# Patient Record
Sex: MALE | Race: WHITE | NOT HISPANIC OR LATINO | Employment: PART TIME | URBAN - NONMETROPOLITAN AREA
[De-identification: names, ages, dates, MRNs, and addresses within clinical notes are randomized per-mention and may not be internally consistent; named-entity substitution may affect disease eponyms.]

---

## 2019-11-22 ENCOUNTER — ALLIED HEALTH/NURSE VISIT (OUTPATIENT)
Dept: FAMILY MEDICINE | Facility: OTHER | Age: 64
End: 2019-11-22
Attending: PHYSICIAN ASSISTANT
Payer: MEDICAID

## 2019-11-22 ENCOUNTER — HOSPITAL ENCOUNTER (EMERGENCY)
Facility: OTHER | Age: 64
Discharge: HOME | End: 2019-11-22
Attending: PHYSICIAN ASSISTANT | Admitting: PHYSICIAN ASSISTANT
Payer: MEDICAID

## 2019-11-22 VITALS — DIASTOLIC BLOOD PRESSURE: 127 MMHG | SYSTOLIC BLOOD PRESSURE: 193 MMHG

## 2019-11-22 VITALS
RESPIRATION RATE: 12 BRPM | SYSTOLIC BLOOD PRESSURE: 180 MMHG | HEIGHT: 66 IN | TEMPERATURE: 97.6 F | DIASTOLIC BLOOD PRESSURE: 105 MMHG | HEART RATE: 88 BPM | WEIGHT: 188 LBS | BODY MASS INDEX: 30.22 KG/M2 | OXYGEN SATURATION: 98 %

## 2019-11-22 DIAGNOSIS — J02.9 SORE THROAT: ICD-10-CM

## 2019-11-22 DIAGNOSIS — R05.9 COUGH: ICD-10-CM

## 2019-11-22 DIAGNOSIS — I16.0 HYPERTENSIVE URGENCY: ICD-10-CM

## 2019-11-22 DIAGNOSIS — Z01.30 BLOOD PRESSURE CHECK: Primary | ICD-10-CM

## 2019-11-22 PROCEDURE — 99283 EMERGENCY DEPT VISIT LOW MDM: CPT | Mod: Z6 | Performed by: PHYSICIAN ASSISTANT

## 2019-11-22 PROCEDURE — 99282 EMERGENCY DEPT VISIT SF MDM: CPT | Performed by: PHYSICIAN ASSISTANT

## 2019-11-22 RX ORDER — CHLORTHALIDONE 25 MG/1
25 TABLET ORAL DAILY
Qty: 14 TABLET | Refills: 0 | Status: SHIPPED | OUTPATIENT
Start: 2019-11-22 | End: 2019-12-05

## 2019-11-22 RX ORDER — BENZONATATE 100 MG/1
100 CAPSULE ORAL 3 TIMES DAILY PRN
Qty: 21 CAPSULE | Refills: 0 | Status: SHIPPED | OUTPATIENT
Start: 2019-11-22 | End: 2019-12-17

## 2019-11-22 ASSESSMENT — MIFFLIN-ST. JEOR: SCORE: 1590.51

## 2019-11-22 NOTE — ED TRIAGE NOTES
"ED Nursing Triage Note (General)   ________________________________    Gallo Holliday is a 64 year old Male that presents to triage private car  With history of  Was seen at  and sent to us due to being hypertensive, went to the  because he has had a sore throat for 2 days and could tell his B/P was high due to feeling his heartbeat in his chest, has a dry cough, reported by patient   Significant symptoms had onset 3 day(s) ago.  BP (!) 199/117   Pulse 94   Temp 97.6  F (36.4  C) (Tympanic)   Resp 12   Ht 1.676 m (5' 6\")   Wt 85.3 kg (188 lb)   SpO2 97%   BMI 30.34 kg/m  t  Patient appears alert , in mild distress., and cooperative behavior.    GCS15  Airway: intact  Breathing noted as Normal.  Circulation Normal  Skin normal  Action taken:  To room 909      PRE HOSPITAL PRIOR LIVING SITUATION Alone  "

## 2019-11-22 NOTE — NURSING NOTE
"Patient presents to clinic for blood pressure check. Patient was brought to [facility] ED per [facility] providers. No chief complaint on file.      Initial BP (!) 193/127 (BP Location: Left arm, Patient Position: Sitting, Cuff Size: Adult Large)  Estimated body mass index is 27.41 kg/m  as calculated from the following:    Height as of 11/27/15: 1.702 m (5' 7\").    Weight as of 11/27/15: 79.4 kg (175 lb).  Medication Reconciliation: complete    Ally Wyatt LPN      "

## 2019-11-22 NOTE — ED AVS SNAPSHOT
[facility]  [address]  [place] [address]  Phone:  [phone]  Fax:  [phone]                                    Gallo Holliday   MRN: [id number]    Department:  [facility]   Date of Visit:  11/22/2019           After Visit Summary Signature Page    I have received my discharge instructions, and my questions have been answered. I have discussed any challenges I see with this plan with the nurse or doctor.    ..........................................................................................................................................  Patient/Patient Representative Signature      ..........................................................................................................................................  Patient Representative Print Name and Relationship to Patient    ..................................................               ................................................  Date                                   Time    ..........................................................................................................................................  Reviewed by Signature/Title    ...................................................              ..............................................  Date                                               Time          22EPIC Rev 08/18

## 2019-11-22 NOTE — DISCHARGE INSTRUCTIONS
Get plenty of fluids and rest.  Take your medication as directed.  We discussed the difference between hypertensive emergency versus hypertensive urgency.  Currently, it appears you are suffering from a hypertensive urgency has there are no signs of endorgan dysfunction.  If you begin to develop increased headaches, blurry vision, chest pain, shortness of breath or difficulty urinating, especially with elevated blood pressures they need to return to the ED for further evaluation.  Otherwise, referral was placed for you to establish care with a PCP and address your overall health.  Most likely, you are suffering from a upper respiratory viral infection, this can sometimes take 1 to 2 weeks to fully recover from, if you develop increased fevers or other worsening or concerning symptoms he should also return to the ED for further evaluation.

## 2019-11-26 ASSESSMENT — ENCOUNTER SYMPTOMS
SHORTNESS OF BREATH: 0
CHILLS: 0
WOUND: 0
CONFUSION: 0
ABDOMINAL PAIN: 0
SORE THROAT: 1
CHEST TIGHTNESS: 0
HEMATURIA: 0
FEVER: 0
BACK PAIN: 0
ADENOPATHY: 0
COUGH: 1
BRUISES/BLEEDS EASILY: 0

## 2019-11-26 NOTE — ED PROVIDER NOTES
History     Chief Complaint   Patient presents with     Hypertension     HPI  Gallo Holliday is a 64 year old male who presents to the ED with a complaint of HTN. He was seen at the  for a sore throat and sent to the ED due to high BP readings. He reports being able to feel his heart beat in his chest when his BP increases. This has been an ongoing issue for him for quite some time, although not currently on BP medication after losing weight a couple of years ago. His ST has been present for a couple of days along with a dry cough. He denies chest pain, sob, HA, abd pain.     Allergies:  Allergies   Allergen Reactions     No Known Drug Allergies        Problem List:    Patient Active Problem List    Diagnosis Date Noted     Health Care Home 07/15/2011     Priority: Medium     X.  DX V65.8 REPLACED WITH 74502 [facility]ALTH CARE HOME (04/08/2013)       CARDIOVASCULAR SCREENING; LDL GOAL LESS THAN 160 10/31/2010     Priority: Medium        Past Medical History:    History reviewed. No pertinent past medical history.    Past Surgical History:    Past Surgical History:   Procedure Laterality Date     OTHER SURGICAL HISTORY      [id number],NO PREVIOUS SURGERY       Family History:    History reviewed. No pertinent family history.    Social History:  Marital Status:   [2]  Social History     Tobacco Use     Smoking status: Never Smoker     Smokeless tobacco: Former User   Substance Use Topics     Alcohol use: Yes     Drug use: None        Medications:    benzonatate (TESSALON) 100 MG capsule  chlorthalidone (HYGROTON) 25 MG tablet  NO ACTIVE MEDICATIONS          Review of Systems   Constitutional: Negative for chills and fever.   HENT: Positive for sore throat. Negative for congestion.    Eyes: Negative for visual disturbance.   Respiratory: Positive for cough. Negative for chest tightness and shortness of breath.    Cardiovascular: Negative for chest pain.   Gastrointestinal: Negative for abdominal pain.   Genitourinary:  "Negative for hematuria.   Musculoskeletal: Negative for back pain.   Skin: Negative for rash and wound.   Neurological: Negative for syncope.   Hematological: Negative for adenopathy. Does not bruise/bleed easily.   Psychiatric/Behavioral: Negative for confusion.       Physical Exam   BP: (!) 199/117  Pulse: 94  Temp: 97.6  F (36.4  C)  Resp: 12  Height: 167.6 cm (5' 6\")  Weight: 85.3 kg (188 lb)  SpO2: 97 %      Physical Exam  Constitutional:       General: He is not in acute distress.     Appearance: He is well-developed. He is not diaphoretic.   HENT:      Head: Normocephalic and atraumatic.   Eyes:      General: No scleral icterus.     Conjunctiva/sclera: Conjunctivae normal.   Neck:      Musculoskeletal: Neck supple.   Cardiovascular:      Rate and Rhythm: Normal rate and regular rhythm.   Pulmonary:      Effort: Pulmonary effort is normal.      Breath sounds: Normal breath sounds.   Abdominal:      Palpations: Abdomen is soft.      Tenderness: There is no abdominal tenderness.   Musculoskeletal:         General: No deformity.   Lymphadenopathy:      Cervical: No cervical adenopathy.   Skin:     General: Skin is warm and dry.      Findings: No rash.   Neurological:      General: No focal deficit present.      Mental Status: He is alert.   Psychiatric:         Mood and Affect: Mood normal.         Behavior: Behavior normal.         ED Course        Procedures               Critical Care time:  none               No results found for this or any previous visit (from the past 24 hour(s)).    Medications - No data to display    Assessments & Plan (with Medical Decision Making)   Pt nontoxic in NAD. Heart, lung, bowel sounds normal. Abd soft/nontender. Hypertensive 180-200 systolic upon arrival otherwise stable vitals, afebrile.     Pt appears not sick and does not appear to be suffering any end organ injury due to his HTN. He is offered basic lab work, ekg, but declines.     He appears to be suffering from most " likely a URI, viral in nature, as well as, Hypertensive urgency.     We will place him on chlorthalidone and given tessalon perles. A referral is placed for him to f/u with pcp, to address overall health and BP. He is to return to ED if there are worsening or concerning symptoms, he understands and agrees with plan and is discharged.     Reece Damon PA-C    I have reviewed the nursing notes.    I have reviewed the findings, diagnosis, plan and need for follow up with the patient.       Discharge Medication List as of 11/22/2019  5:18 PM      START taking these medications    Details   benzonatate (TESSALON) 100 MG capsule Take 1 capsule (100 mg) by mouth 3 times daily as needed for cough, Disp-21 capsule, R-0, E-Prescribe      chlorthalidone (HYGROTON) 25 MG tablet Take 1 tablet (25 mg) by mouth daily for 14 days, Disp-14 tablet, R-0, E-Prescribe             Final diagnoses:   Hypertensive urgency   Cough   Sore throat       11/22/2019   [facility] AND Reece Rasheed PA  11/26/19 1048

## 2019-12-05 ENCOUNTER — OFFICE VISIT (OUTPATIENT)
Dept: FAMILY MEDICINE | Facility: OTHER | Age: 64
End: 2019-12-05
Attending: NURSE PRACTITIONER
Payer: MEDICAID

## 2019-12-05 VITALS
HEART RATE: 88 BPM | TEMPERATURE: 98.6 F | SYSTOLIC BLOOD PRESSURE: 150 MMHG | HEIGHT: 66 IN | WEIGHT: 191.4 LBS | RESPIRATION RATE: 16 BRPM | DIASTOLIC BLOOD PRESSURE: 102 MMHG | BODY MASS INDEX: 30.76 KG/M2

## 2019-12-05 DIAGNOSIS — K08.89 TOOTH PAIN: ICD-10-CM

## 2019-12-05 DIAGNOSIS — I10 ESSENTIAL HYPERTENSION: Primary | ICD-10-CM

## 2019-12-05 LAB
ANION GAP SERPL CALCULATED.3IONS-SCNC: 12 MMOL/L (ref 3–14)
BUN SERPL-MCNC: 24 MG/DL (ref 7–25)
CALCIUM SERPL-MCNC: 9.9 MG/DL (ref 8.6–10.3)
CHLORIDE SERPL-SCNC: 95 MMOL/L (ref 98–107)
CO2 SERPL-SCNC: 32 MMOL/L (ref 21–31)
CREAT SERPL-MCNC: 1.16 MG/DL (ref 0.7–1.3)
GFR SERPL CREATININE-BSD FRML MDRD: 64 ML/MIN/{1.73_M2}
GLUCOSE SERPL-MCNC: 116 MG/DL (ref 70–105)
POTASSIUM SERPL-SCNC: 3.5 MMOL/L (ref 3.5–5.1)
SODIUM SERPL-SCNC: 139 MMOL/L (ref 134–144)

## 2019-12-05 PROCEDURE — 80048 BASIC METABOLIC PNL TOTAL CA: CPT | Mod: ZL | Performed by: NURSE PRACTITIONER

## 2019-12-05 PROCEDURE — 36415 COLL VENOUS BLD VENIPUNCTURE: CPT | Mod: ZL | Performed by: NURSE PRACTITIONER

## 2019-12-05 PROCEDURE — G0463 HOSPITAL OUTPT CLINIC VISIT: HCPCS

## 2019-12-05 PROCEDURE — 99203 OFFICE O/P NEW LOW 30 MIN: CPT | Performed by: NURSE PRACTITIONER

## 2019-12-05 RX ORDER — LISINOPRIL AND HYDROCHLOROTHIAZIDE 20; 25 MG/1; MG/1
1 TABLET ORAL DAILY
Qty: 30 TABLET | Refills: 3 | Status: SHIPPED | OUTPATIENT
Start: 2019-12-05 | End: 2019-12-17

## 2019-12-05 ASSESSMENT — ANXIETY QUESTIONNAIRES
2. NOT BEING ABLE TO STOP OR CONTROL WORRYING: NOT AT ALL
6. BECOMING EASILY ANNOYED OR IRRITABLE: NOT AT ALL
5. BEING SO RESTLESS THAT IT IS HARD TO SIT STILL: NOT AT ALL
GAD7 TOTAL SCORE: 0
1. FEELING NERVOUS, ANXIOUS, OR ON EDGE: NOT AT ALL
4. TROUBLE RELAXING: NOT AT ALL
3. WORRYING TOO MUCH ABOUT DIFFERENT THINGS: NOT AT ALL
7. FEELING AFRAID AS IF SOMETHING AWFUL MIGHT HAPPEN: NOT AT ALL

## 2019-12-05 ASSESSMENT — PATIENT HEALTH QUESTIONNAIRE - PHQ9: SUM OF ALL RESPONSES TO PHQ QUESTIONS 1-9: 0

## 2019-12-05 ASSESSMENT — PAIN SCALES - GENERAL: PAINLEVEL: NO PAIN (0)

## 2019-12-05 ASSESSMENT — MIFFLIN-ST. JEOR: SCORE: 1605.93

## 2019-12-05 NOTE — NURSING NOTE
Patient presents to the clinic for an ER follow up.  Medication Reconciliation Completed.    Tony Purvis LPN  12/5/2019 9:36 AM

## 2019-12-05 NOTE — PATIENT INSTRUCTIONS
Lisinopril/HCTZ 1 tablet daily  Monitor your blood pressure and bring readings in at follow up  Follow up in 2 weeks for physical and blood pressure follow up       Patient Education     Controlling High Blood Pressure  High blood pressure (hypertension) is often called the silent killer. This is because many people who have it don t know it. High blood pressure can raise your risk of heart attack, stroke, and heart failure. Controlling your blood pressure can decrease your risk of these problems. Know your blood pressure and remember to check it regularly. Doing so can save your life.  Blood pressure measurements are given as 2 numbers. Systolic blood pressure is the upper number. This is the pressure when the heart contracts. Diastolic blood pressure is the lower number. This is the pressure when the heart relaxes between beats.  Blood pressure is categorized as normal, elevated, or stage 1 or stage 2 high blood pressure:    Normal blood pressure is systolic of less than 120 and diastolic of less than 80 (120/80)    Elevated blood pressure is systolic of 120 to 129 and diastolic less than 80    Stage 1 high blood pressure is systolic is 130 to 139 or diastolic between 80 to 89    Stage 2 high blood pressure is when systolic is 140 or higher or the diastolic is 90 or higher  Here are some things you can do to help control your blood pressure.    Choose heart-healthy foods    Select low-salt, low-fat foods. Limit sodium intake to 2,400 mg per day or the amount suggested by your healthcare provider.    Limit canned, dried, cured, packaged, and fast foods. These can contain a lot of salt.    Eat 8 to 10 servings of fruits and vegetables every day.    Choose lean meats, fish, or chicken.    Eat whole-grain pasta, brown rice, and beans.    Eat 2 to 3 servings of low-fat or fat-free dairy products.    Ask your doctor about the DASH eating plan. This plan helps reduce blood pressure.    When you go to a restaurant, ask  that your meal be prepared with no added salt.  Maintain a healthy weight    Ask your healthcare provider how many calories to eat a day. Then stick to that number.    Ask your healthcare provider what weight range is healthiest for you. If you are overweight, a weight loss of only 3% to 5% of your body weight can help lower blood pressure. Generally, a good weight loss goal is to lose 10% of your body weight in a year.    Limit snacks and sweets.    Get regular exercise.  Get up and get active    Choose activities you enjoy. Find ones you can do with friends or family. This includes bicycling, dancing, walking, and jogging.    Park farther away from building entrances.    Use stairs instead of the elevator.    When you can, walk or bike instead of driving.    [place] leaves, garden, or do household repairs.    Be active at a moderate to vigorous level of physical activity for at least 40 minutes for a minimum of 3 to 4 days a week.   Manage stress    Make time to relax and enjoy life. Find time to laugh.    Communicate your concerns with your loved ones and your healthcare provider.    Visit with family and friends, and keep up with hobbies.  Limit alcohol and quit smoking    Men should have no more than 2 drinks per day.    Women should have no more than 1 drink per day.    Talk with your healthcare provider about quitting smoking. Smoking significantly increases your risk for heart disease and stroke. Ask your healthcare provider about community smoking cessation programs and other options.  Medicines  If lifestyle changes aren t enough, your healthcare provider may prescribe high blood pressure medicine. Take all medicines as prescribed. If you have any questions about your medicines, ask your healthcare provider before stopping or changing them.   Date Last Reviewed: 4/27/2016 2000-2018 [employer]. [address], [place] [address]. All rights reserved. This information is not intended as  a substitute for professional medical care. Always follow your healthcare professional's instructions.

## 2019-12-05 NOTE — PROGRESS NOTES
HPI:    Gallo Holliday is a 64 year old male who presents to clinic today for ER follow-up.  He was in the emergency room on November 22 for hypertensive urgency as well as cough and sore throat.  He was placed on chlorthalidone 25 mg daily for hypertension.  He reports he is been taking the chlorthalidone daily.  He does not bring in his home blood pressure readings but reports his systolics have been 160-180.  He does have a history of elevated blood pressure but does not want to know what medications he was on in the past.  He reports that he typically will treat his blood pressure with weight reduction and this helps.  He denies any family history of hypertension or cardiac disease.  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  He does not smoke.  He identifies his diet is being high in fruits and vegetables and nuts as well as low sodium.    He also has concerns today regarding tooth pain to left upper jaw.  He reports a feeling followed up quite some time ago and he has had increased pain over the past 2 to 3 days.  He is been treating this with warm and cool liquids, ibuprofen and topical management.  He is working to try to find a dentist that will accept him at this time.    History reviewed. No pertinent past medical history.    Past Surgical History:   Procedure Laterality Date     OTHER SURGICAL HISTORY      [id number],NO PREVIOUS SURGERY       History reviewed. No pertinent family history.    Social History     Socioeconomic History     Marital status:      Spouse name: Not on file     Number of children: Not on file     Years of education: Not on file     Highest education level: Not on file   Occupational History     Not on file   Social Needs     Financial resource strain: Not on file     Food insecurity:     Worry: Not on file     Inability: Not on file     Transportation needs:     Medical: Not on file     Non-medical: Not on file   Tobacco  "Use     Smoking status: Never Smoker     Smokeless tobacco: Former User   Substance and Sexual Activity     Alcohol use: Yes     Drug use: Never     Sexual activity: Yes   Lifestyle     Physical activity:     Days per week: Not on file     Minutes per session: Not on file     Stress: Not on file   Relationships     Social connections:     Talks on phone: Not on file     Gets together: Not on file     Attends [employer] service: Not on file     Active member of club or organization: Not on file     Attends meetings of clubs or organizations: Not on file     Relationship status: Not on file     Intimate partner violence:     Fear of current or ex partner: Not on file     Emotionally abused: Not on file     Physically abused: Not on file     Forced sexual activity: Not on file   Other Topics Concern     Not on file   Social History Narrative     2 children  [employer] for [employer]       Current Outpatient Medications   Medication Sig Dispense Refill     lisinopril-hydrochlorothiazide (PRINZIDE/ZESTORETIC) 20-25 MG tablet Take 1 tablet by mouth daily 30 tablet 3     NO ACTIVE MEDICATIONS . 0 0       Allergies   Allergen Reactions     No Known Drug Allergies        ROS:  Pertinent positives and negatives are noted in HPI.    EXAM:  BP (!) 150/102 (BP Location: Right arm, Patient Position: Sitting, Cuff Size: Adult Large)   Pulse 88   Temp 98.6  F (37  C)   Resp 16   Ht 1.676 m (5' 6\")   Wt 86.8 kg (191 lb 6.4 oz)   BMI 30.89 kg/m    General appearance: well appearing male, in no acute distress  Head: normocephalic, atraumatic  Eyes: conjunctivae normal  Oropharynx: moist mucous membranes, fractured tooth left upper jaw  Neck: supple without adenopathy  Respiratory: clear to auscultation bilaterally  Cardiac: RRR with no murmurs  Psychological: normal affect, alert and pleasant      ASSESSMENT AND PLAN:    1. Essential hypertension    2. Tooth pain      Blood pressure today is 150/102.  This is not being adequately " managed with the chlorthalidone.  He is hesitant to take more than one medication so we will switch him over to lisinopril with hydrochlorthiazide at this time.  I want him to continue to monitor his blood pressures at home and follow-up in 2 weeks.  BMP is pending.  He should bring his readings in in 2 weeks and we will complete a full physical as well as follow-up on hypertension.  Recommend continue exercise and low-sodium diet.      BARBARA Fernandez CNP..................12/5/2019 9:41 AM      This document was prepared using voice generated software.  While every attempt was made for accuracy, grammatical errors may exist.

## 2019-12-06 ASSESSMENT — ANXIETY QUESTIONNAIRES: GAD7 TOTAL SCORE: 0

## 2019-12-17 ENCOUNTER — OFFICE VISIT (OUTPATIENT)
Dept: FAMILY MEDICINE | Facility: OTHER | Age: 64
End: 2019-12-17
Attending: NURSE PRACTITIONER
Payer: MEDICAID

## 2019-12-17 VITALS
HEART RATE: 95 BPM | HEIGHT: 66 IN | RESPIRATION RATE: 18 BRPM | OXYGEN SATURATION: 95 % | TEMPERATURE: 97.5 F | SYSTOLIC BLOOD PRESSURE: 136 MMHG | BODY MASS INDEX: 31.06 KG/M2 | WEIGHT: 193.25 LBS | DIASTOLIC BLOOD PRESSURE: 88 MMHG

## 2019-12-17 DIAGNOSIS — I10 ESSENTIAL HYPERTENSION: ICD-10-CM

## 2019-12-17 DIAGNOSIS — Z00.00 ROUTINE HISTORY AND PHYSICAL EXAMINATION OF ADULT: Primary | ICD-10-CM

## 2019-12-17 DIAGNOSIS — R73.09 ELEVATED GLUCOSE: ICD-10-CM

## 2019-12-17 DIAGNOSIS — Z12.5 SCREENING FOR PROSTATE CANCER: ICD-10-CM

## 2019-12-17 DIAGNOSIS — Z12.11 SPECIAL SCREENING FOR MALIGNANT NEOPLASMS, COLON: ICD-10-CM

## 2019-12-17 DIAGNOSIS — Z13.220 LIPID SCREENING: ICD-10-CM

## 2019-12-17 DIAGNOSIS — I10 BENIGN ESSENTIAL HYPERTENSION: ICD-10-CM

## 2019-12-17 LAB
ALBUMIN SERPL-MCNC: 4.5 G/DL (ref 3.5–5.7)
ALP SERPL-CCNC: 62 U/L (ref 34–104)
ALT SERPL W P-5'-P-CCNC: 17 U/L (ref 7–52)
ANION GAP SERPL CALCULATED.3IONS-SCNC: 9 MMOL/L (ref 3–14)
AST SERPL W P-5'-P-CCNC: 20 U/L (ref 13–39)
BILIRUB SERPL-MCNC: 0.4 MG/DL (ref 0.3–1)
BUN SERPL-MCNC: 22 MG/DL (ref 7–25)
CALCIUM SERPL-MCNC: 9.1 MG/DL (ref 8.6–10.3)
CHLORIDE SERPL-SCNC: 101 MMOL/L (ref 98–107)
CHOLEST SERPL-MCNC: 235 MG/DL
CO2 SERPL-SCNC: 30 MMOL/L (ref 21–31)
CREAT SERPL-MCNC: 1.03 MG/DL (ref 0.7–1.3)
GFR SERPL CREATININE-BSD FRML MDRD: 73 ML/MIN/{1.73_M2}
GLUCOSE SERPL-MCNC: 109 MG/DL (ref 70–105)
HBA1C MFR BLD: 5.5 % (ref 4–6)
HDLC SERPL-MCNC: 49 MG/DL (ref 23–92)
LDLC SERPL CALC-MCNC: 149 MG/DL
NONHDLC SERPL-MCNC: 186 MG/DL
POTASSIUM SERPL-SCNC: 4 MMOL/L (ref 3.5–5.1)
PROT SERPL-MCNC: 7.5 G/DL (ref 6.4–8.9)
PSA SERPL-ACNC: 2.34 NG/ML
SODIUM SERPL-SCNC: 140 MMOL/L (ref 134–144)
TRIGL SERPL-MCNC: 187 MG/DL

## 2019-12-17 PROCEDURE — G0463 HOSPITAL OUTPT CLINIC VISIT: HCPCS

## 2019-12-17 PROCEDURE — 84153 ASSAY OF PSA TOTAL: CPT | Mod: ZL | Performed by: NURSE PRACTITIONER

## 2019-12-17 PROCEDURE — 80053 COMPREHEN METABOLIC PANEL: CPT | Mod: ZL | Performed by: NURSE PRACTITIONER

## 2019-12-17 PROCEDURE — 83036 HEMOGLOBIN GLYCOSYLATED A1C: CPT | Mod: ZL | Performed by: NURSE PRACTITIONER

## 2019-12-17 PROCEDURE — 36415 COLL VENOUS BLD VENIPUNCTURE: CPT | Mod: ZL | Performed by: NURSE PRACTITIONER

## 2019-12-17 PROCEDURE — G0103 PSA SCREENING: HCPCS | Mod: ZL | Performed by: NURSE PRACTITIONER

## 2019-12-17 PROCEDURE — 80061 LIPID PANEL: CPT | Mod: ZL | Performed by: NURSE PRACTITIONER

## 2019-12-17 PROCEDURE — 99396 PREV VISIT EST AGE 40-64: CPT | Performed by: NURSE PRACTITIONER

## 2019-12-17 RX ORDER — LISINOPRIL AND HYDROCHLOROTHIAZIDE 20; 25 MG/1; MG/1
1 TABLET ORAL DAILY
Qty: 90 TABLET | Refills: 3 | Status: SHIPPED | OUTPATIENT
Start: 2019-12-17 | End: 2021-01-06

## 2019-12-17 ASSESSMENT — MIFFLIN-ST. JEOR: SCORE: 1606.39

## 2019-12-17 ASSESSMENT — PAIN SCALES - GENERAL: PAINLEVEL: NO PAIN (0)

## 2019-12-17 NOTE — LETTER
December 19, 2019      Gallo Holliday  [address] [address]        Dear ,    We are writing to inform you of your test results.    Test results indicate you may require additional follow up, see comment below.  Your cholesterol level is significantly elevated.  You are at a 16% risk of having heart attack or stroke in the next 10 years.  Any risk factor over 7%, it is recommended that we start you on a cholesterol-lowering medication.  Please call me if you are willing to take this medication and I will get this sent over to the pharmacy.  The remainder of your labs are stable, no diabetes is identified.    Resulted Orders   Hemoglobin A1c   Result Value Ref Range    Hemoglobin A1C 5.5 4.0 - 6.0 %   Comprehensive Metabolic Panel   Result Value Ref Range    Sodium 140 134 - 144 mmol/L    Potassium 4.0 3.5 - 5.1 mmol/L    Chloride 101 98 - 107 mmol/L    Carbon Dioxide 30 21 - 31 mmol/L    Anion Gap 9 3 - 14 mmol/L    Glucose 109 (H) 70 - 105 mg/dL    Urea Nitrogen 22 7 - 25 mg/dL    Creatinine 1.03 0.70 - 1.30 mg/dL    GFR Estimate 73 >60 mL/min/[1.73_m2]    GFR Estimate If Black 88 >60 mL/min/[1.73_m2]    Calcium 9.1 8.6 - 10.3 mg/dL    Bilirubin Total 0.4 0.3 - 1.0 mg/dL    Albumin 4.5 3.5 - 5.7 g/dL    Protein Total 7.5 6.4 - 8.9 g/dL    Alkaline Phosphatase 62 34 - 104 U/L    ALT 17 7 - 52 U/L    AST 20 13 - 39 U/L   Lipid Panel   Result Value Ref Range    Cholesterol 235 (H) <200 mg/dL    Triglycerides 187 (H) <150 mg/dL      Comment:      Borderline high:  150-199 mg/dl  High:             200-499 mg/dl  Very high:       >499 mg/dl      HDL Cholesterol 49 23 - 92 mg/dL    LDL Cholesterol Calculated 149 (H) <100 mg/dL      Comment:      Above desirable:  100-129 mg/dl  Borderline High:  130-159 mg/dL  High:             160-189 mg/dL  Very high:       >189 mg/dl      Non HDL Cholesterol 186 (H) <130 mg/dL      Comment:      Above Desirable:  130-159 mg/dl  Borderline high:  160-189  mg/dl  High:             190-219 mg/dl  Very high:       >219 mg/dl     PSA Screen GH   Result Value Ref Range    PSA Screen 2.340 <3.100 ng/mL      Comment:      The DXI Access PSAS WHO assay is a two site immunoenzymatic assay. Assay   values obtained with different assay methods cannot be used interchangeably   due to differences in assay methods and reagent specificity.       The 10-year ASCVD risk score (Rickieisabelle ARREAGA Jr., et al., 2013) is: 16.1%    Values used to calculate the score:      Age: 64 years      Sex: Male      Is Non- : No      Diabetic: No      Tobacco smoker: No      Systolic Blood Pressure: 136 mmHg      Is BP treated: Yes      HDL Cholesterol: 49 mg/dL      Total Cholesterol: 235 mg/dL    If you have any questions or concerns, please call the clinic at the number listed above.       Sincerely,        BARBARA Fernandez CNP

## 2019-12-17 NOTE — NURSING NOTE
Patient presents to clinic today for a physical. He states he is also needing to make sure his BP medication is working.     No LMP for male patient.  Medication Reconciliation: complete    Melissa Vogel LPN  12/17/2019 1:58 PM

## 2019-12-17 NOTE — PROGRESS NOTES
Nursing Notes:   Melissa Vogel LPN  12/17/2019  2:03 PM  Signed  Patient presents to clinic today for a physical. He states he is also needing to make sure his BP medication is working.     No LMP for male patient.  Medication Reconciliation: complete    Melissa Vogel LPN  12/17/2019 1:58 PM      HPI: Gallo Holliday who presents for a yearly exam.      Concerns include: No concerns today    HTN-he was seen in the emergency room on November 22 for hypertensive emergency and then followed up with myself on December 5.  Currently taking lisinopril with hydrochlorothiazide 20-25 mg daily.  BMP was obtained on December 5 and normal other than mildly elevated glucose level at 116.  He reports in starting a blood pressure medication he has mildly decreased energy level.  He has not had any headaches or orthostatic hypotension symptoms.  No swelling in his legs.  No cough.    STD concerns: Denies  Cholesterol/DM concerns/screening: due  Prostate cancer screening discussed: Has not had PSA in the past, will obtain this today  Family history of colon or prostate CA?: none  Colonoscopy: at age 51   Immunizations: Tdap completed 2013, influenza vaccine UTD    Patient Active Problem List    Diagnosis Date Noted     Health Care Home 07/15/2011     Priority: Medium     X.  DX V65.8 REPLACED WITH 05794 HEALTH CARE HOME (04/08/2013)       CARDIOVASCULAR SCREENING; LDL GOAL LESS THAN 160 10/31/2010     Priority: Medium       History reviewed. No pertinent past medical history.    Past Surgical History:   Procedure Laterality Date     cyst removed      left wrist     HCL SQUAMOUS CELL CARCINOMA AG      left shoulder-excision       Family History   Problem Relation Age of Onset     Melanoma Mother      Skin Cancer Father      Prostate Cancer Father      Melanoma Sister      Hernia Brother        Social History     Tobacco Use     Smoking status: Never Smoker     Smokeless tobacco: Former User   Substance Use Topics     Alcohol  "use: Yes       Current Outpatient Medications   Medication Sig Dispense Refill     lisinopril-hydrochlorothiazide (PRINZIDE/ZESTORETIC) 20-25 MG tablet Take 1 tablet by mouth daily 90 tablet 3       Allergies   Allergen Reactions     No Known Drug Allergies        REVIEW OF SYSTEMS:  Refer to HPI.    Physical Exam:  /88 (BP Location: Right arm, Patient Position: Sitting, Cuff Size: Adult Regular)   Pulse 95   Temp 97.5  F (36.4  C) (Tympanic)   Resp 18   Ht 1.664 m (5' 5.5\")   Wt 87.7 kg (193 lb 4 oz)   SpO2 95%   BMI 31.67 kg/m     CONSTITUTIONAL:  Alert, cooperative, NAD.  HEAD:  Normal. Normocephalic, atraumatic.  EYES:  Normal external eye, conjunctiva, lids.  No scleral icterus.  ENT/MOUTH:  External ears and nose normal.  TMs normal.  Moist mucous membranes. Oropharynx clear.   ENDO: No thyromegaly or thyroid nodules.  LYMPH:  Nocervical or supraclavicular LA.    CARDIOVASCULAR: Regular, S1, S2.  No S3 or S4.  No murmur/gallop/rub.  No peripheral edema.  RESPIRATORY: CTA bilaterally, no wheezes, rhonchi or rales.  GI: Bowel sounds wnl. Soft, nontender, nondistended.  No masses or HSM.  No rebound or guarding.  : Normal male genitalia.  No discharge orpenile ulcerations.  No testicular masses or swelling.  MSKEL: Grossly normal ROM.  No clubbing.  INTEGUMENTARY:  Warm, dry.  No rash noted on exposed skin.  NEUROLOGIC:  Facies symmetric.  Grossly normal movement and tone.  No tremor.  PSYCHIATRIC:  Affect normal.  Speech fluent.       PHQ Depression Screen  PHQ-9 SCORE 12/5/2019   PHQ-9 Total Score 0       Labs  Results for orders placed or performed in visit on 12/17/19   Hemoglobin A1c     Status: None   Result Value Ref Range    Hemoglobin A1C 5.5 4.0 - 6.0 %   Comprehensive Metabolic Panel     Status: Abnormal   Result Value Ref Range    Sodium 140 134 - 144 mmol/L    Potassium 4.0 3.5 - 5.1 mmol/L    Chloride 101 98 - 107 mmol/L    Carbon Dioxide 30 21 - 31 mmol/L    Anion Gap 9 3 - 14 mmol/L "    Glucose 109 (H) 70 - 105 mg/dL    Urea Nitrogen 22 7 - 25 mg/dL    Creatinine 1.03 0.70 - 1.30 mg/dL    GFR Estimate 73 >60 mL/min/[1.73_m2]    GFR Estimate If Black 88 >60 mL/min/[1.73_m2]    Calcium 9.1 8.6 - 10.3 mg/dL    Bilirubin Total 0.4 0.3 - 1.0 mg/dL    Albumin 4.5 3.5 - 5.7 g/dL    Protein Total 7.5 6.4 - 8.9 g/dL    Alkaline Phosphatase 62 34 - 104 U/L    ALT 17 7 - 52 U/L    AST 20 13 - 39 U/L   Lipid Panel     Status: Abnormal   Result Value Ref Range    Cholesterol 235 (H) <200 mg/dL    Triglycerides 187 (H) <150 mg/dL    HDL Cholesterol 49 23 - 92 mg/dL    LDL Cholesterol Calculated 149 (H) <100 mg/dL    Non HDL Cholesterol 186 (H) <130 mg/dL   PSA Screen GH     Status: None   Result Value Ref Range    PSA Screen 2.340 <3.100 ng/mL       ASSESSMENT/PLAN:    ICD-10-CM    1. Routine history and physical examination of adult Z00.00 Comprehensive Metabolic Panel     Comprehensive Metabolic Panel   2. Screening for prostate cancer Z12.5 PSA Screen GH     PSA Screen GH   3. Special screening for malignant neoplasms, colon Z12.11 ABSTRACT COLOGUARD-NO CHARGE   4. Lipid screening Z13.220 Lipid Panel     Lipid Panel   5. Benign essential hypertension I10 Comprehensive Metabolic Panel     Comprehensive Metabolic Panel   6. Elevated glucose R73.09 Comprehensive Metabolic Panel     Hemoglobin A1c     Hemoglobin A1c     Comprehensive Metabolic Panel   7. Essential hypertension I10 lisinopril-hydrochlorothiazide (PRINZIDE/ZESTORETIC) 20-25 MG tablet     He declines colonoscopy, does not feel he needs this at this time but is in agreement to have Cologuard.  Order and paperwork was placed.  PSA, lipid panel, CMP and A1c as noted above.  No diabetes noted despite previous elevated glucose.  His 10-year ASCVD risk is 16.1%.  I would do recommend lipid-lowering medication.  He is notified via letter and I will order this if he is in agreement.  Waiting to hear back from patient.  Recommend Mediterranean, heart  healthy diet and daily exercise.  Blood pressure medications refilled for 1 year.  The 10-year ASCVD risk score (Rickieisabelle ARREAGA JrShannan, et al., 2013) is: 16.1%    Values used to calculate the score:      Age: 64 years      Sex: Male      Is Non- : No      Diabetic: No      Tobacco smoker: No      Systolic Blood Pressure: 136 mmHg      Is BP treated: Yes      HDL Cholesterol: 49 mg/dL      Total Cholesterol: 235 mg/dL      Colon and prostate cancer screening discussed.      Counseled on healthy diet andexercise.    There are no Patient Instructions on file for this visit.     BARBARA Fernandez CNP PA-C..................12/17/2019 1:55 PM

## 2020-12-31 ENCOUNTER — ALLIED HEALTH/NURSE VISIT (OUTPATIENT)
Dept: FAMILY MEDICINE | Facility: OTHER | Age: 65
End: 2020-12-31
Attending: FAMILY MEDICINE
Payer: COMMERCIAL

## 2020-12-31 DIAGNOSIS — R05.9 COUGH: Primary | ICD-10-CM

## 2020-12-31 PROCEDURE — C9803 HOPD COVID-19 SPEC COLLECT: HCPCS

## 2020-12-31 PROCEDURE — U0003 INFECTIOUS AGENT DETECTION BY NUCLEIC ACID (DNA OR RNA); SEVERE ACUTE RESPIRATORY SYNDROME CORONAVIRUS 2 (SARS-COV-2) (CORONAVIRUS DISEASE [COVID-19]), AMPLIFIED PROBE TECHNIQUE, MAKING USE OF HIGH THROUGHPUT TECHNOLOGIES AS DESCRIBED BY CMS-2020-01-R: HCPCS | Mod: ZL | Performed by: FAMILY MEDICINE

## 2020-12-31 NOTE — PROGRESS NOTES
Chief Complaint   Patient presents with     Covid 19 Testing     Cough  Medication Reconciliation: complete    Evelia Ray, SABRAN

## 2021-01-01 ENCOUNTER — TELEPHONE (OUTPATIENT)
Dept: NURSING | Facility: CLINIC | Age: 66
End: 2021-01-01

## 2021-01-01 DIAGNOSIS — U07.1 2019 NOVEL CORONAVIRUS DISEASE (COVID-19): ICD-10-CM

## 2021-01-01 LAB
SARS-COV-2 RNA SPEC QL NAA+PROBE: ABNORMAL
SPECIMEN SOURCE: ABNORMAL

## 2021-01-01 NOTE — TELEPHONE ENCOUNTER
Coronavirus (COVID-19) Notification    Reason for call  Notify of POSITIVE  COVID-19 lab result, assess symptoms,  review [facility] recommendations    Lab Result   Lab test for 2019-nCoV rRt-PCR or SARS-COV-2 PCR  Oropharyngeal AND/OR nasopharyngeal swabs were POSITIVE for 2019-nCoV RNA [OR] SARS-COV-2 RNA (COVID-19) RNA     We have been unable to reach Patient by phone at this time to notify of their Positive COVID-19 result.  Left voicemail message requesting a call back to [phone] [facility] for results.        POSITIVE COVID-19 Letter sent.    Lani Yi RN

## 2021-01-03 ENCOUNTER — NURSE TRIAGE (OUTPATIENT)
Dept: NURSING | Facility: CLINIC | Age: 66
End: 2021-01-03

## 2021-01-03 NOTE — TELEPHONE ENCOUNTER
"Coronavirus (COVID-19) Notification    Caller Name (Patient, parent, daughter/son, grandparent, etc)  Patient    Reason for call  Notify of Positive Coronavirus (COVID-19) lab results, assess symptoms,  review  "[employer] [place] recommendations    Lab Result    Lab test:  2019-nCoV rRt-PCR or SARS-CoV-2 PCR    Oropharyngeal AND/OR nasopharyngeal swabs is POSITIVE for 2019-nCoV RNA/SARS-COV-2 PCR (COVID-19 virus)    RN Recommendations/Instructions per [facility] Coronavirus COVID-19 recommendations    Brief introduction script  Introduce self then review script:  \"I am calling on behalf of [employer].  We were notified that your Coronavirus test (COVID-19) for was POSITIVE for the virus.  I have some information to relay to you but first I wanted to mention that the [place] Dept of Health will be contacting you shortly [it's possible MD already called Patient] to talk to you more about how you are feeling and other people you have had contact with who might now also have the virus.  Also,  "[employer] [place] is Partnering with the [facility] for Covid-19 research, you may be contacted directly by research staff.\"    Assessment (Inquire about Patient's current symptoms)   Assessment   Current Symptoms at time of phone call: (if no symptoms, document No symptoms] Cough and mild fever   Symptoms onset (if applicable) Wednesday 12/30/2020     If at time of call, Patients symptoms hare worsened, the Patient should contact 911 or have someone drive them to Emergency Dept promptly:      If Patient calling 911, inform 911 personal that you have tested positive for the Coronavirus (COVID-19).  Place mask on and await 911 to arrive.    If Emergency Dept, If possible, please have another adult drive you to the Emergency Dept but you need to wear mask when in contact with other people.      Monoclonal Antibody Administration    You may be eligible to receive a new treatment with a monoclonal antibody for preventing " "hospitalization in patients at high risk for complications from COVID-19.   This medication is still experimental and available on a limited basis; it is given through an IV and must be given at an infusion center. Please note that not all people who are eligible will receive the medication since it is in limited supply.     Are you interested in being considered for this medication?  Yes Is the patient 18 years of age or older? Yes.  Does the patient use supplemental oxygen?  No.  Patient criteria for selection: 55 years old or more with Hypertension  Does the patient fit the criteria: Yes: COVID-19 Monoclonal Antibody Referral completed.  Diagnosis code: COVID-19  U07.1    If patient qualifies based on above criteria:  \"We will contact you if you are selected to receive the medication in the next 1-2 days.   This is time sensitive and if you are not selected in the next 1-2 days, you will not receive the medication.  If you do not receive a call to schedule, you have not been selected.\"    Review information with Patient    Your result was positive. This means you have COVID-19 (coronavirus).  We have sent you a letter that reviews the information that I'll be reviewing with you now.    How can I protect others?    If you have symptoms: stay home and away from others (self-isolate) until:    You've had no fever--and no medicine that reduces fever--for 1 full day (24 hours). And       Your other symptoms have gotten better. For example, your cough or breathing has improved. And     At least 10 days have passed since your symptoms started. (If you've been told by a doctor that you have a weak immune system, wait 20 days.)     If you don't have symptoms: Stay home and away from others (self-isolate) until at least 10 days have passed since your first positive COVID-19 test. (Date test collected)    During this time:    Stay in your own room, including for meals. Use your own bathroom if you can.    Stay away from " others in your home. No hugging, kissing or shaking hands. No visitors.     Don't go to work, school or anywhere else.     Clean  high touch  surfaces often (doorknobs, counters, handles, etc.). Use a household cleaning spray or wipes. You'll find a full list on the EPA website at www.epa.gov/pesticide-registration/list-n-disinfectants-use-against-sars-cov-2.     Cover your mouth and nose with a mask, tissue or other face covering to avoid spreading germs.    Wash your hands and face often with soap and water.    Caregivers in these groups are at risk for severe illness due to COVID-19:  o People 65 years and older  o People who live in a nursing home or long-term care facility  o People with chronic disease (lung, heart, cancer, diabetes, kidney, liver, immunologic)  o People who have a weakened immune system, including those who:  - Are in cancer treatment  - Take medicine that weakens the immune system, such as corticosteroids  - Had a bone marrow or organ transplant  - Have an immune deficiency  - Have poorly controlled HIV or AIDS  - Are obese (body mass index of 40 or higher)  - Smoke regularly    Caregivers should wear gloves while washing dishes, handling laundry and cleaning bedrooms and bathrooms.    Wash and dry laundry with special caution. Don't shake dirty laundry, and use the warmest water setting you can.    If you have a weakened immune system, ask your doctor about other actions you should take.    For more tips, go to www.cdc.gov/coronavirus/2019-ncov/downloads/10Things.pdf.    You should not go back to work until you meet the guidelines above for ending your home isolation. You don't need to be retested for COVID-19 before going back to work--studies show that you won't spread the virus if it's been at least 10 days since your symptoms started (or 20 days, if you have a weak immune system).    Employers: This document serves as formal notice of your employee's medical guidelines for going back to  work. They must meet the above guidelines before going back to work in person.    How can I take care of myself?    1. Get lots of rest. Drink extra fluids (unless a doctor has told you not to).    2. Take Tylenol (acetaminophen) for fever or pain. If you have liver or kidney problems, ask your family doctor if it's okay to take Tylenol.     Take either:     650 mg (two 325 mg pills) every 4 to 6 hours, or     1,000 mg (two 500 mg pills) every 8 hours as needed.     Note: Don't take more than 3,000 mg in one day. Acetaminophen is found in many medicines (both prescribed and over-the-counter medicines). Read all labels to be sure you don't take too much.    For children, check the Tylenol bottle for the right dose (based on their age or weight).    3. If you have other health problems (like cancer, heart failure, an organ transplant or severe kidney disease): Call your specialty clinic if you don't feel better in the next 2 days.    4. Know when to call 911: Emergency warning signs include:    Trouble breathing or shortness of breath    Pain or pressure in the chest that doesn't go away    Feeling confused like you haven't felt before, or not being able to wake up    Bluish-colored lips or face    5. Sign up for [employer]. We know it's scary to hear that you have COVID-19. We want to track your symptoms to make sure you're okay over the next 2 weeks. Please look for an email from [employer]--this is a free, online program that we'll use to keep in touch. To sign up, follow the link in the email. Learn more at [email or web address].    Where can I get more information?    [facility]: [email or web address]    Coronavirus Basics: www.health.[facility].[place]/diseases/coronavirus/basics.html    What to Do If You're Sick: www.cdc.gov/coronavirus/2019-ncov/about/steps-when-sick.html    Ending Home Isolation: www.cdc.gov/coronavirus/2019-ncov/hcp/disposition-in-home-patients.html     Caring for Someone  with COVID-19: www.cdc.gov/coronavirus/2019-ncov/if-you-are-sick/care-for-someone.html     [facility] clinical trials (COVID-19 research studies): clinicalaffairs.[facility]-clinical-trials     A Positive COVID-19 letter will be sent via [employer] or the mail. (Exception, no letters sent to Presurgerical/Preprocedure Patients)    Vivien Rodrigez LPN

## 2021-01-04 ENCOUNTER — TELEPHONE (OUTPATIENT)
Dept: INFUSION THERAPY | Facility: OTHER | Age: 66
End: 2021-01-04

## 2021-01-04 DIAGNOSIS — I10 ESSENTIAL HYPERTENSION: Primary | ICD-10-CM

## 2021-01-04 DIAGNOSIS — U07.1 2019 NOVEL CORONAVIRUS DISEASE (COVID-19): ICD-10-CM

## 2021-01-04 RX ORDER — HEPARIN SODIUM,PORCINE 10 UNIT/ML
5 VIAL (ML) INTRAVENOUS
Status: CANCELLED | OUTPATIENT
Start: 2021-01-04

## 2021-01-04 RX ORDER — HEPARIN SODIUM (PORCINE) LOCK FLUSH IV SOLN 100 UNIT/ML 100 UNIT/ML
5 SOLUTION INTRAVENOUS
Status: CANCELLED | OUTPATIENT
Start: 2021-01-04

## 2021-01-04 NOTE — LETTER
January 6, 2021      Gallo Holliday  [address] [address]        Dear Gallo,     This letter is to remind you that you are due for your annual exam with Izzy Mcleod. Your last comprehensive visit was more than 12 months ago.    A LIMITED refill of lisinopril-hydrochlorothiazide (ZESTORETIC) 20-25 MG tablet has been called into your pharmacy. Additional refills require you to complete this appointment.    Please call the clinic at [phone] to schedule your appointment.    If you should require additional refills before your scheduled appointment, please contact your pharmacy and we will refill your medication until the date of your appointment.    Thank you for choosing [facility] and [facility] for your health care needs.    Sincerely,    Refill RN  [facility];

## 2021-01-04 NOTE — TELEPHONE ENCOUNTER
"Alma (significant other) calling in regards to Gallo who is with her. Alma reports that Gallo tested Covid positive on New Year's Barbara. Pulse oximeter has been reading at 90-92%, fever for a long time was 99 and now 102 (oral). His cough is not letting up.\" Gallo denies feeling faint, dizzy, or lightheaded. Patient denies breathing difficulty or history of blood clots. Patient has been taking Ricola, [place], using Neti pot, and drinking tea with honey. Gallo reports having high blood pressure and does take a medication for this. Patient does have a chronic cough, but current cough is bothersome at night waking patient up every 3-4 hours. Gallo has been drinking fluids and taking Tylenol. Current temp is 101.6 (oral) after taking some Tylenol and pulse ox reading while on phone with RN was 91-94%.     Triage guidelines recommend to be seen in the ED. RN advised to call back with any changes, worsening of symptoms, and questions or concerns. Patient verbalized understanding of and agreement with plan and had no further questions.     Reason for Disposition    [1] Fever > 101 F (38.3 C) AND [2] age > 60    Additional Information    Negative: [1] Breathing stopped AND [2] hasn't returned    Negative: Choking on something    Negative: Severe difficulty breathing (e.g., struggling for each breath, speaks in single words)    Negative: Bluish (or gray) lips or face now    Negative: Difficult to awaken or acting confused (e.g., disoriented, slurred speech)    Negative: Passed out (i.e., lost consciousness, collapsed and was not responding)    Negative: Wheezing started suddenly after medicine, an allergic food or bee sting    Negative: Stridor    Negative: Slow, shallow and weak breathing    Negative: Sounds like a life-threatening emergency to the triager    Negative: [1] MODERATE difficulty breathing (e.g., speaks in phrases, SOB even at rest, pulse 100-120) AND [2] NEW-onset or WORSE than normal    " "Negative: Wheezing can be heard across the room    Negative: Drooling or spitting out saliva (because can't swallow)    Negative: History of prior \"blood clot\" in leg or lungs (i.e., deep vein thrombosis, pulmonary embolism)    Negative: History of inherited increased risk of blood clots (e.g., Factor 5 Leiden, Anti-thrombin 3, Protein C or Protein S deficiency, Prothrombin mutation)    Negative: Recent illness requiring prolonged bedrest (i.e., immobilization)    Negative: Hip or leg fracture in past 2 months (e.g., had cast on leg or ankle)    Negative: Major surgery in the past month    Negative: Recent long-distance travel with prolonged time in car, bus, plane, or train (i.e., within past 2 weeks; 6 or  more hours duration)    Negative: Extra heart beats OR irregular heart beating   (i.e., \"palpitations\")    Negative: Fever > 103 F (39.4 C)    Protocols used: BREATHING DIFFICULTY-GONZALEZ-JEFFREY Shea RN  [facility]   "

## 2021-01-06 RX ORDER — LISINOPRIL AND HYDROCHLOROTHIAZIDE 20; 25 MG/1; MG/1
TABLET ORAL
Qty: 90 TABLET | Refills: 0 | Status: SHIPPED | OUTPATIENT
Start: 2021-01-06 | End: 2021-04-13

## 2021-01-06 NOTE — TELEPHONE ENCOUNTER
[facility] sent Rx request for the following:      Requested Prescriptions   Pending Prescriptions Disp Refills   lisinopril-hydrochlorothiazide (ZESTORETIC) 20-25 MG tablet [Pharmacy Med Name: lisinopril 20 mg-hydrochlorothiazide 25 mg tablet] 90 tablet 3    Sig: TAKE 1 TABLET BY MOUTH ONCE DAILY   Last Prescription Date:   12/17/19  Last Fill Qty/Refills:         90, R-3    Last Office Visit:              12/17/19 (Physical)  Future Office visit:           None     Diuretics (Including Combos) Protocol Failed - 1/6/2021  9:33 AM       Failed - Blood pressure under 140/90 in past 12 months       Failed - Recent (12 mo) or future (30 days) visit within the authorizing provider's specialty       Failed - Normal serum creatinine on file in past 12 months       Failed - Normal serum potassium on file in past 12 months       Failed - Normal serum sodium on file in past 12 months      ACE Inhibitors (Including Combos) Protocol Failed - 1/6/2021  9:33 AM       Failed - Blood pressure under 140/90 in past 12 months       Failed - Recent (12 mo) or future (30 days) visit within the authorizing provider's specialty       Failed - Normal serum creatinine on file in past 12 months       Failed - Normal serum potassium on file in past 12 months     Patient is due for annual physical. Reminder letter sent to Pt. Prescription approved per [facility] Refill Protocol for 90-day shakir refill. Afia Roberts RN .............. 1/6/2021  9:36 AM

## 2021-04-12 DIAGNOSIS — I10 ESSENTIAL HYPERTENSION: Primary | ICD-10-CM

## 2021-04-13 RX ORDER — LISINOPRIL AND HYDROCHLOROTHIAZIDE 20; 25 MG/1; MG/1
TABLET ORAL
Qty: 90 TABLET | Refills: 0 | Status: SHIPPED | OUTPATIENT
Start: 2021-04-13 | End: 2021-04-16

## 2021-04-13 NOTE — TELEPHONE ENCOUNTER
[facility] sent Rx request for the following:      Requested Prescriptions   Pending Prescriptions Disp Refills   lisinopril-hydrochlorothiazide (ZESTORETIC) 20-25 MG tablet [Pharmacy Med Name: lisinopril 20 mg-hydrochlorothiazide 25 mg tablet] 90 tablet 0    Sig: TAKE 1 TABLET BY MOUTH ONCE DAILY   Last Prescription Date:   1/6/21  Last Fill Qty/Refills:         90, R-0    Last Office Visit:              12/17/19  Future Office visit:           None  Routing refill request to provider for review/approval because:   Diuretics (Including Combos) Protocol Failed - 4/13/2021 10:32 AM       Failed - Blood pressure under 140/90 in past 12 months       Failed - Normal serum creatinine on file in past 12 months       Failed - Normal serum potassium on file in past 12 months       Failed - Normal serum sodium on file in past 12 months      ACE Inhibitors (Including Combos) Protocol Failed - 4/13/2021 10:32 AM       Failed - Blood pressure under 140/90 in past 12 months       Failed - Normal serum creatinine on file in past 12 months       Failed - Normal serum potassium on file in past 12 months     Patient is overdue for annual exam. Reminder letter sent to Pt on 1/6/21 and Pt failed to schedule appointment.  Attempted reaching Pt, to assist him in scheduling appointment. Left message on machine to call back. Afia Roberts RN .............. 4/13/2021  10:51 AM

## 2021-04-13 NOTE — TELEPHONE ENCOUNTER
Patient returned call and his last name and  were verified. Pt reminded of the information below, and he was transferred to scheduling line, to set up appointment:    Next 5 appointments (look out 90 days)    2021  2:00 PM  PHYSICAL with BARBARA Ramos [facility] ([facility] ) [address]   [place] [address]  [phone]

## 2021-04-14 ENCOUNTER — OFFICE VISIT (OUTPATIENT)
Dept: FAMILY MEDICINE | Facility: OTHER | Age: 66
End: 2021-04-14
Attending: FAMILY MEDICINE
Payer: COMMERCIAL

## 2021-04-14 PROCEDURE — 91300 PR COVID VAC PFIZER DIL RECON 30 MCG/0.3 ML IM: CPT

## 2021-04-16 ENCOUNTER — OFFICE VISIT (OUTPATIENT)
Dept: FAMILY MEDICINE | Facility: OTHER | Age: 66
End: 2021-04-16
Attending: NURSE PRACTITIONER
Payer: COMMERCIAL

## 2021-04-16 VITALS
RESPIRATION RATE: 18 BRPM | TEMPERATURE: 97 F | BODY MASS INDEX: 32.17 KG/M2 | SYSTOLIC BLOOD PRESSURE: 160 MMHG | WEIGHT: 200.2 LBS | DIASTOLIC BLOOD PRESSURE: 112 MMHG | HEIGHT: 66 IN | OXYGEN SATURATION: 98 % | HEART RATE: 84 BPM

## 2021-04-16 DIAGNOSIS — Z11.59 ENCOUNTER FOR HEPATITIS C SCREENING TEST FOR LOW RISK PATIENT: ICD-10-CM

## 2021-04-16 DIAGNOSIS — Z00.00 ROUTINE HISTORY AND PHYSICAL EXAMINATION OF ADULT: Primary | ICD-10-CM

## 2021-04-16 DIAGNOSIS — Z12.11 COLON CANCER SCREENING: ICD-10-CM

## 2021-04-16 DIAGNOSIS — I10 ESSENTIAL HYPERTENSION: ICD-10-CM

## 2021-04-16 DIAGNOSIS — M79.644 BILATERAL THUMB PAIN: ICD-10-CM

## 2021-04-16 DIAGNOSIS — Z13.220 LIPID SCREENING: ICD-10-CM

## 2021-04-16 DIAGNOSIS — M79.645 BILATERAL THUMB PAIN: ICD-10-CM

## 2021-04-16 LAB
ALBUMIN SERPL-MCNC: 4.4 G/DL (ref 3.5–5.7)
ALP SERPL-CCNC: 57 U/L (ref 34–104)
ALT SERPL W P-5'-P-CCNC: 13 U/L (ref 7–52)
ANION GAP SERPL CALCULATED.3IONS-SCNC: 9 MMOL/L (ref 3–14)
AST SERPL W P-5'-P-CCNC: 18 U/L (ref 13–39)
BILIRUB SERPL-MCNC: 0.7 MG/DL (ref 0.3–1)
BUN SERPL-MCNC: 15 MG/DL (ref 7–25)
CALCIUM SERPL-MCNC: 9.2 MG/DL (ref 8.6–10.3)
CHLORIDE SERPL-SCNC: 101 MMOL/L (ref 98–107)
CHOLEST SERPL-MCNC: 196 MG/DL
CO2 SERPL-SCNC: 29 MMOL/L (ref 21–31)
CREAT SERPL-MCNC: 1.03 MG/DL (ref 0.7–1.3)
GFR SERPL CREATININE-BSD FRML MDRD: 73 ML/MIN/{1.73_M2}
GLUCOSE SERPL-MCNC: 103 MG/DL (ref 70–105)
HDLC SERPL-MCNC: 41 MG/DL (ref 23–92)
LDLC SERPL CALC-MCNC: 132 MG/DL
NONHDLC SERPL-MCNC: 155 MG/DL
POTASSIUM SERPL-SCNC: 4 MMOL/L (ref 3.5–5.1)
PROT SERPL-MCNC: 7.3 G/DL (ref 6.4–8.9)
SODIUM SERPL-SCNC: 139 MMOL/L (ref 134–144)
TRIGL SERPL-MCNC: 117 MG/DL

## 2021-04-16 PROCEDURE — 86803 HEPATITIS C AB TEST: CPT | Mod: ZL | Performed by: NURSE PRACTITIONER

## 2021-04-16 PROCEDURE — 80053 COMPREHEN METABOLIC PANEL: CPT | Mod: ZL | Performed by: NURSE PRACTITIONER

## 2021-04-16 PROCEDURE — 80061 LIPID PANEL: CPT | Mod: ZL | Performed by: NURSE PRACTITIONER

## 2021-04-16 PROCEDURE — 99396 PREV VISIT EST AGE 40-64: CPT | Performed by: NURSE PRACTITIONER

## 2021-04-16 PROCEDURE — 36415 COLL VENOUS BLD VENIPUNCTURE: CPT | Mod: ZL | Performed by: NURSE PRACTITIONER

## 2021-04-16 RX ORDER — LISINOPRIL 20 MG/1
20 TABLET ORAL DAILY
Qty: 90 TABLET | Refills: 3 | Status: SHIPPED | OUTPATIENT
Start: 2021-04-16 | End: 2021-05-27 | Stop reason: DRUGHIGH

## 2021-04-16 RX ORDER — LISINOPRIL AND HYDROCHLOROTHIAZIDE 20; 25 MG/1; MG/1
1 TABLET ORAL DAILY
Qty: 90 TABLET | Refills: 3 | Status: SHIPPED | OUTPATIENT
Start: 2021-04-16 | End: 2021-06-11

## 2021-04-16 SDOH — HEALTH STABILITY: MENTAL HEALTH: HOW OFTEN DO YOU HAVE 6 OR MORE DRINKS ON ONE OCCASION?: NOT ASKED

## 2021-04-16 SDOH — HEALTH STABILITY: MENTAL HEALTH: HOW OFTEN DO YOU HAVE A DRINK CONTAINING ALCOHOL?: 2-4 TIMES A MONTH

## 2021-04-16 SDOH — HEALTH STABILITY: MENTAL HEALTH: HOW MANY STANDARD DRINKS CONTAINING ALCOHOL DO YOU HAVE ON A TYPICAL DAY?: NOT ASKED

## 2021-04-16 ASSESSMENT — PAIN SCALES - GENERAL: PAINLEVEL: MILD PAIN (3)

## 2021-04-16 ASSESSMENT — MIFFLIN-ST. JEOR: SCORE: 1640.85

## 2021-04-16 NOTE — PROGRESS NOTES
HPI:    Gallo Holliday is a 65 year old male who presents to clinic today for routine physical.    Hypertension: Continues to take lisinopril with hydrochlorothiazide 20-25 mg daily for hypertension. Not missing medications. Does not check home blood pressures currently. Has gained weight, about 15# since pandemic started. No chest pain, no headaches, LE edema.     COVID-19: He tested positive for COVID-19 on New Year's Barbara. Acute illness about 5 days.  Had Pfizer COVID-19 vaccine #1 on April 14.    Preventative CARE: Current on Tdap. Last colonoscopy around age 51.  He is interested in Cologuard.  Declines need for HIV testing but agrees to hep C testing for screening purposes.  Due for lipid screening today.  Unable to complete shingles vaccine due to being in the midst of getting COVID-19 vaccine.    Bilateral thumb pain.  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  He has been doing a lot of physical labor for this.  He will use ice which helps significantly.  Does not take any ibuprofen or Tylenol.    Past Medical History:   Diagnosis Date     2019 novel coronavirus disease (COVID-19) 1/4/2021       Past Surgical History:   Procedure Laterality Date     cyst removed      left wrist     HCL SQUAMOUS CELL CARCINOMA AG      left shoulder-excision       Family History   Problem Relation Age of Onset     Melanoma Mother      Skin Cancer Father      Prostate Cancer Father      Melanoma Sister      Hernia Brother        Social History     Socioeconomic History     Marital status: Single     Spouse name: Not on file     Number of children: Not on file     Years of education: Not on file     Highest education level: Not on file   Occupational History     Not on file   Social Needs     Financial resource strain: Not on file     Food insecurity     Worry: Not on file     Inability: Not on file      "Transportation needs     Medical: Not on file     Non-medical: Not on file   Tobacco Use     Smoking status: Never Smoker     Smokeless tobacco: Former User   Substance and Sexual Activity     Alcohol use: Yes     Frequency: 2-4 times a month     Drug use: Never     Sexual activity: Yes     Partners: Female   Lifestyle     Physical activity     Days per week: Not on file     Minutes per session: Not on file     Stress: Not on file   Relationships     Social connections     Talks on phone: Not on file     Gets together: Not on file     Attends Restorationist service: Not on file     Active member of club or organization: Not on file     Attends meetings of clubs or organizations: Not on file     Relationship status: Not on file     Intimate partner violence     Fear of current or ex partner: Not on file     Emotionally abused: Not on file     Physically abused: Not on file     Forced sexual activity: Not on file   Other Topics Concern     Not on file   Social History Narrative    , 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    EXAM:  BP (!) 160/112 (BP Location: Right arm, Patient Position: Sitting, Cuff Size: Adult Regular)   Pulse 84   Temp 97  F (36.1  C) (Tympanic)   Resp 18   Ht 1.676 m (5' 6\")   Wt 90.8 kg (200 lb 3.2 oz)   SpO2 98%   BMI 32.31 kg/m    General appearance: well appearing male patient in no acute distress  Head: normocephalic, atraumatic  Ears: TM's with cone of light, no erythema, canals clear bilaterally  Eyes: conjunctivae normal  Oropharynx: moist mucous membranes, tonsils without erythema, exudates or " petechiae, no post nasal drip seen  Neck: supple without adenopathy  Respiratory: clear to auscultation bilaterally  Cardiac: RRR with no murmurs  Abdomen: soft, nontender, no masses or organomegaly  Musculoskeletal: base of thumbs tender with palpation  Dermatological: no rashes or lesions  Psychological: normal affect, alert and pleasant    PHQ Depression Screen  PHQ-9 SCORE 12/5/2019   PHQ-9 Total Score 0       ASSESSMENT AND PLAN:    1. Routine history and physical examination of adult    2. Colon cancer screening    3. Essential hypertension    4. Encounter for hepatitis C screening test for low risk patient    5. Lipid screening    6. Bilateral thumb pain      Labs ordered including CMP and lipid  colo guard ordered   Increased lisinopril to 40 mg total-continue with lisinopril/HCTZ 20-25 mg and lisinopril 20 mg daily  Continue with sx management of thumb pain-follow up as needed       BARBARA Fernandez CNP..................4/16/2021 2:01 PM

## 2021-04-16 NOTE — PATIENT INSTRUCTIONS
Continue with lisinopril and hydrochlorothiazide daily  Start lisinopril 20 mg daily in addition  Check blood pressures at home or the pharmacy a couple times a week  Follow up in 1 month for recheck

## 2021-04-16 NOTE — LETTER
April 20, 2021      Gallo Holliday  [address] [facility] [address]        Dear ,    We are writing to inform you of your test results.    Your test results fall within the expected range(s) or remain unchanged from previous results.  Please continue with current treatment plan.    Resulted Orders   Lipid Panel   Result Value Ref Range    Cholesterol 196 <200 mg/dL    Triglycerides 117 <150 mg/dL    HDL Cholesterol 41 23 - 92 mg/dL    LDL Cholesterol Calculated 132 (H) <100 mg/dL      Comment:      Above desirable:  100-129 mg/dl  Borderline High:  130-159 mg/dL  High:             160-189 mg/dL  Very high:       >189 mg/dl      Non HDL Cholesterol 155 (H) <130 mg/dL      Comment:      Above Desirable:  130-159 mg/dl  Borderline high:  160-189 mg/dl  High:             190-219 mg/dl  Very high:       >219 mg/dl     Comprehensive Metabolic Panel   Result Value Ref Range    Sodium 139 134 - 144 mmol/L    Potassium 4.0 3.5 - 5.1 mmol/L    Chloride 101 98 - 107 mmol/L    Carbon Dioxide 29 21 - 31 mmol/L    Anion Gap 9 3 - 14 mmol/L    Glucose 103 70 - 105 mg/dL    Urea Nitrogen 15 7 - 25 mg/dL    Creatinine 1.03 0.70 - 1.30 mg/dL    GFR Estimate 73 >60 mL/min/[1.73_m2]    GFR Estimate If Black 88 >60 mL/min/[1.73_m2]    Calcium 9.2 8.6 - 10.3 mg/dL    Bilirubin Total 0.7 0.3 - 1.0 mg/dL    Albumin 4.4 3.5 - 5.7 g/dL    Protein Total 7.3 6.4 - 8.9 g/dL    Alkaline Phosphatase 57 34 - 104 U/L    ALT 13 7 - 52 U/L    AST 18 13 - 39 U/L   Hepatitis C antibody   Result Value Ref Range    Hepatitis C Antibody Nonreactive NR^Nonreactive      Comment:      Assay performance characteristics have not been established for newborns,   infants, and children         If you have any questions or concerns, please call the clinic at the number listed above.       Sincerely,      BARBARA Ramos CNP

## 2021-04-16 NOTE — NURSING NOTE
"Chief Complaint   Patient presents with     Physical     Patient presents to clinic for physical. He states he has no concerns at this time.     Initial BP (!) 158/120 (BP Location: Right arm, Patient Position: Sitting, Cuff Size: Adult Regular)   Pulse 84   Temp 97  F (36.1  C) (Tympanic)   Resp 18   Ht 1.676 m (5' 6\")   Wt 90.8 kg (200 lb 3.2 oz)   SpO2 98%   BMI 32.31 kg/m   Estimated body mass index is 32.31 kg/m  as calculated from the following:    Height as of this encounter: 1.676 m (5' 6\").    Weight as of this encounter: 90.8 kg (200 lb 3.2 oz).         Medication Reconciliation: Complete      Rachael Gunn   "

## 2021-04-19 LAB — HCV AB SERPL QL IA: NONREACTIVE

## 2021-05-05 ENCOUNTER — IMMUNIZATION (OUTPATIENT)
Dept: FAMILY MEDICINE | Facility: OTHER | Age: 66
End: 2021-05-05
Attending: FAMILY MEDICINE
Payer: COMMERCIAL

## 2021-05-05 PROCEDURE — 0002A PR COVID VAC PFIZER DIL RECON 30 MCG/0.3 ML IM: CPT

## 2021-05-27 ENCOUNTER — OFFICE VISIT (OUTPATIENT)
Dept: FAMILY MEDICINE | Facility: OTHER | Age: 66
End: 2021-05-27
Attending: NURSE PRACTITIONER
Payer: COMMERCIAL

## 2021-05-27 VITALS
OXYGEN SATURATION: 97 % | DIASTOLIC BLOOD PRESSURE: 110 MMHG | SYSTOLIC BLOOD PRESSURE: 160 MMHG | TEMPERATURE: 97.6 F | WEIGHT: 201.8 LBS | BODY MASS INDEX: 32.43 KG/M2 | HEIGHT: 66 IN | HEART RATE: 87 BPM | RESPIRATION RATE: 17 BRPM

## 2021-05-27 DIAGNOSIS — I10 ESSENTIAL HYPERTENSION: Primary | ICD-10-CM

## 2021-05-27 PROCEDURE — G0463 HOSPITAL OUTPT CLINIC VISIT: HCPCS

## 2021-05-27 PROCEDURE — 99213 OFFICE O/P EST LOW 20 MIN: CPT | Performed by: NURSE PRACTITIONER

## 2021-05-27 RX ORDER — AMLODIPINE BESYLATE 5 MG/1
5 TABLET ORAL DAILY
Qty: 90 TABLET | Refills: 0 | Status: SHIPPED | OUTPATIENT
Start: 2021-05-27 | End: 2021-06-11 | Stop reason: DRUGHIGH

## 2021-05-27 ASSESSMENT — PAIN SCALES - GENERAL: PAINLEVEL: NO PAIN (0)

## 2021-05-27 ASSESSMENT — MIFFLIN-ST. JEOR: SCORE: 1648.11

## 2021-05-27 NOTE — PROGRESS NOTES
"HPI:    Gallo Holliday is a 65 year old male who presents to clinic today for blood pressure follow-up.  He was seen for routine physical on April 16, 2021.  Blood pressure was elevated at 160/112.  Lisinopril was increased to 40 mg daily total.  He will take lisinopril 20 mg daily along with lisinopril and hydrochlorothiazide 20-25 mg.  He had labs in April which were stable.  He reports he has not checked his blood pressures at home.  He does feel he is under a lot of stress at home.  He has not missed any medications and does not smoke.  He is very physically active with his work.    Past Medical History:   Diagnosis Date     2019 novel coronavirus disease (COVID-19) 1/4/2021         Current Outpatient Medications   Medication Sig Dispense Refill     amLODIPine (NORVASC) 5 MG tablet Take 1 tablet (5 mg) by mouth daily 90 tablet 0     lisinopril-hydrochlorothiazide (ZESTORETIC) 20-25 MG tablet Take 1 tablet by mouth daily 90 tablet 3       Allergies   Allergen Reactions     No Known Drug Allergies        ROS:  Pertinent positives and negatives are noted in HPI.    EXAM:  BP (!) 160/110 (BP Location: Right arm, Patient Position: Sitting, Cuff Size: Adult Regular)   Pulse 87   Temp 97.6  F (36.4  C) (Temporal)   Resp 17   Ht 1.676 m (5' 6\")   Wt 91.5 kg (201 lb 12.8 oz)   SpO2 97%   BMI 32.57 kg/m    General appearance: well appearing male, in no acute distress  Respiratory: clear to auscultation bilaterally  Cardiac: RRR with no murmurs  Psychological: normal affect, alert and pleasant    ASSESSMENT AND PLAN:    1. Essential hypertension      He did not respond to the increase of lisinopril.  At this time we will have him discontinue the extra 20 mg lisinopril, continue with lisinopril with hydrochlorthiazide 20-25 mg daily.  Add amlodipine 5 mg daily.  Plan to follow-up in 2 weeks for recheck of blood pressure.  Encouraged him to check blood pressures at home 1-2 times a week until he is seen.      Izzy PATEL" BARBARA Mcleod CNP..................5/27/2021 4:23 PM

## 2021-05-27 NOTE — NURSING NOTE
"Chief Complaint   Patient presents with     RECHECK     BP     Patient presents to clinic for BP recheck. He states he hasn't been checking it at home.     Initial BP (!) 160/110 (BP Location: Right arm, Patient Position: Sitting, Cuff Size: Adult Regular)   Pulse 87   Temp 97.6  F (36.4  C) (Temporal)   Resp 17   Ht 1.676 m (5' 6\")   Wt 91.5 kg (201 lb 12.8 oz)   SpO2 97%   BMI 32.57 kg/m   Estimated body mass index is 32.57 kg/m  as calculated from the following:    Height as of this encounter: 1.676 m (5' 6\").    Weight as of this encounter: 91.5 kg (201 lb 12.8 oz).         Medication Reconciliation: Complete      Rachael Gunn   "

## 2021-06-11 ENCOUNTER — OFFICE VISIT (OUTPATIENT)
Dept: FAMILY MEDICINE | Facility: OTHER | Age: 66
End: 2021-06-11
Attending: NURSE PRACTITIONER
Payer: COMMERCIAL

## 2021-06-11 VITALS
OXYGEN SATURATION: 97 % | BODY MASS INDEX: 31.5 KG/M2 | HEART RATE: 77 BPM | HEIGHT: 66 IN | SYSTOLIC BLOOD PRESSURE: 150 MMHG | WEIGHT: 196 LBS | RESPIRATION RATE: 19 BRPM | TEMPERATURE: 97.4 F | DIASTOLIC BLOOD PRESSURE: 92 MMHG

## 2021-06-11 DIAGNOSIS — I10 ESSENTIAL HYPERTENSION: Primary | ICD-10-CM

## 2021-06-11 PROCEDURE — G0463 HOSPITAL OUTPT CLINIC VISIT: HCPCS

## 2021-06-11 PROCEDURE — 99213 OFFICE O/P EST LOW 20 MIN: CPT | Performed by: NURSE PRACTITIONER

## 2021-06-11 RX ORDER — AMLODIPINE BESYLATE 10 MG/1
10 TABLET ORAL DAILY
Qty: 90 TABLET | Refills: 0 | Status: SHIPPED | OUTPATIENT
Start: 2021-06-11 | End: 2021-06-29

## 2021-06-11 RX ORDER — LISINOPRIL AND HYDROCHLOROTHIAZIDE 20; 25 MG/1; MG/1
1 TABLET ORAL DAILY
Qty: 90 TABLET | Refills: 3 | Status: SHIPPED | OUTPATIENT
Start: 2021-06-11 | End: 2022-08-23

## 2021-06-11 RX ORDER — LISINOPRIL 20 MG/1
20 TABLET ORAL DAILY
COMMUNITY
Start: 2021-04-16 | End: 2021-06-11

## 2021-06-11 ASSESSMENT — PAIN SCALES - GENERAL: PAINLEVEL: NO PAIN (0)

## 2021-06-11 ASSESSMENT — MIFFLIN-ST. JEOR: SCORE: 1621.8

## 2021-06-11 NOTE — PATIENT INSTRUCTIONS
Take amlodipine 10 mg daily  Continue with lisinopril with hydrochlorothiazide daily  Monitor your blood pressure at home  Follow up in 2-4 weeks

## 2021-06-11 NOTE — PROGRESS NOTES
"HPI:    Gallo Holliday is a 65 year old male who presents to clinic today for hypertension follow-up.  He was last seen on May 27 for follow-up on hypertension.  At that time we stopped lisinopril and start him on amlodipine 5 mg daily.  He continued on hydrochlorthiazide 25 mg daily as well.  He reports that he has been drinking 6 cans of diet pop a day.  Otherwise he has had a fairly good diet.  He decided this past week to stop drinking the pop as someone told him as well as sodium and this.  He has not had any side effects amlodipine denies any swelling in his ankles.    Past Medical History:   Diagnosis Date     2019 novel coronavirus disease (COVID-19) 1/4/2021         Current Outpatient Medications   Medication Sig Dispense Refill     amLODIPine (NORVASC) 10 MG tablet Take 1 tablet (10 mg) by mouth daily 90 tablet 0     lisinopril-hydrochlorothiazide (ZESTORETIC) 20-25 MG tablet Take 1 tablet by mouth daily 90 tablet 3       Allergies   Allergen Reactions     No Known Drug Allergies        ROS:  Pertinent positives and negatives are noted in HPI.    EXAM:  BP (!) 150/92 (BP Location: Right arm, Patient Position: Sitting, Cuff Size: Adult Regular)   Pulse 77   Temp 97.4  F (36.3  C) (Temporal)   Resp 19   Ht 1.676 m (5' 6\")   Wt 88.9 kg (196 lb)   SpO2 97%   BMI 31.64 kg/m    General appearance: well appearing male, in no acute distress  Respiratory: clear to auscultation bilaterally  Cardiac: RRR with no murmurs  Psychological: normal affect, alert and pleasant    ASSESSMENT AND PLAN:    1. Essential hypertension      Blood pressure has improved mildly but is still not at goal.  We will have him continue with the lisinopril with hydrochlorothiazide and start amlodipine 10 mg daily.  Plan to follow-up in 2 to 4 weeks.  Discussed ongoing dietary changes to help with his hypertension as well.      BARBARA Fernandez CNP..................6/11/2021 12:43 PM    "

## 2021-06-11 NOTE — NURSING NOTE
"Chief Complaint   Patient presents with     RECHECK     BP     Patient presents to clinic for BP follow-up.    Initial BP (!) 150/100 (BP Location: Right arm, Patient Position: Sitting, Cuff Size: Adult Regular)   Pulse 77   Temp 97.4  F (36.3  C) (Temporal)   Resp 19   Ht 1.676 m (5' 6\")   Wt 88.9 kg (196 lb)   SpO2 97%   BMI 31.64 kg/m   Estimated body mass index is 31.64 kg/m  as calculated from the following:    Height as of this encounter: 1.676 m (5' 6\").    Weight as of this encounter: 88.9 kg (196 lb).         Medication Reconciliation: Complete      Rachael Gunn   "

## 2021-06-29 ENCOUNTER — OFFICE VISIT (OUTPATIENT)
Dept: FAMILY MEDICINE | Facility: OTHER | Age: 66
End: 2021-06-29
Attending: NURSE PRACTITIONER
Payer: COMMERCIAL

## 2021-06-29 VITALS
HEART RATE: 79 BPM | BODY MASS INDEX: 31.82 KG/M2 | RESPIRATION RATE: 16 BRPM | WEIGHT: 198 LBS | SYSTOLIC BLOOD PRESSURE: 138 MMHG | HEIGHT: 66 IN | OXYGEN SATURATION: 97 % | TEMPERATURE: 97 F | DIASTOLIC BLOOD PRESSURE: 80 MMHG

## 2021-06-29 DIAGNOSIS — I10 ESSENTIAL HYPERTENSION: Primary | ICD-10-CM

## 2021-06-29 PROCEDURE — G0463 HOSPITAL OUTPT CLINIC VISIT: HCPCS

## 2021-06-29 PROCEDURE — 99213 OFFICE O/P EST LOW 20 MIN: CPT | Performed by: NURSE PRACTITIONER

## 2021-06-29 RX ORDER — AMLODIPINE BESYLATE 10 MG/1
10 TABLET ORAL DAILY
Qty: 90 TABLET | Refills: 3 | Status: SHIPPED | OUTPATIENT
Start: 2021-06-29 | End: 2022-08-23

## 2021-06-29 ASSESSMENT — PAIN SCALES - GENERAL: PAINLEVEL: NO PAIN (0)

## 2021-06-29 ASSESSMENT — MIFFLIN-ST. JEOR: SCORE: 1625.87

## 2021-06-29 NOTE — PROGRESS NOTES
"HPI:    Gallo Holliday is a 66 year old male who presents to clinic today for blood pressure follow up.  Who was last seen on 6/11/2021 for follow-up on hypertension.  Previously lisinopril has been stopped and restarted amlodipine 5 mg daily.  He continues on hydrochlorothiazide 25 mg daily.  At his last visit we increased amlodipine to 10 mg daily with continuation of lisinopril with hydrochlorthiazide.  He continues to work on dietary changes including decreasing his sodium intake through pop.  He checked his blood pressure at [facility] yesterday and it was 124/83.  He is looking getting a blood pressure machine but was not sure which one to purchase.    Past Medical History:   Diagnosis Date     2019 novel coronavirus disease (COVID-19) 1/4/2021       Current Outpatient Medications   Medication Sig Dispense Refill     amLODIPine (NORVASC) 10 MG tablet Take 1 tablet (10 mg) by mouth daily 90 tablet 3     lisinopril-hydrochlorothiazide (ZESTORETIC) 20-25 MG tablet Take 1 tablet by mouth daily 90 tablet 3       Allergies   Allergen Reactions     No Known Drug Allergies        ROS:  Pertinent positives and negatives are noted in HPI.    EXAM:  /80 (BP Location: Right arm, Patient Position: Sitting, Cuff Size: Adult Regular)   Pulse 79   Temp 97  F (36.1  C) (Temporal)   Resp 16   Ht 1.676 m (5' 6\")   Wt 89.8 kg (198 lb)   SpO2 97%   BMI 31.96 kg/m    General appearance: well appearing male, in no acute distress  Respiratory: clear to auscultation bilaterally  Cardiac: RRR with no murmurs  Psychological: normal affect, alert and pleasant    ASSESSMENT AND PLAN:    1. Essential hypertension      Essential hypertension, blood pressure recheck today was 138/80.  At this time he is at max dose of amlodipine, lisinopril and hydrochlorthiazide.  Blood pressure is under goal currently.  We will have him continue with his current medications and lifestyle modifications.  Prescription for blood pressure monitor " was provided.  Follow-up if he has blood pressures that are consistently greater or lower than goal for adjustment and possible consideration of nephrology.      BARBARA Fernandez CNP..................6/29/2021 12:46 PM

## 2021-06-29 NOTE — NURSING NOTE
"Chief Complaint   Patient presents with     RECHECK     BP     Patient presents to clinic for BP f/u. He states he took it at [facility] yesterday and it was good.     Initial BP (!) 148/94 (BP Location: Right arm, Patient Position: Sitting, Cuff Size: Adult Regular)   Pulse 79   Temp 97  F (36.1  C) (Temporal)   Resp 16   Ht 1.676 m (5' 6\")   Wt 89.8 kg (198 lb)   SpO2 97%   BMI 31.96 kg/m   Estimated body mass index is 31.96 kg/m  as calculated from the following:    Height as of this encounter: 1.676 m (5' 6\").    Weight as of this encounter: 89.8 kg (198 lb).         Medication Reconciliation: Complete      Rachael Gunn   "

## 2021-10-27 ENCOUNTER — ALLIED HEALTH/NURSE VISIT (OUTPATIENT)
Dept: FAMILY MEDICINE | Facility: OTHER | Age: 66
End: 2021-10-27
Attending: FAMILY MEDICINE
Payer: COMMERCIAL

## 2021-10-27 DIAGNOSIS — Z20.6 EXPOSURE TO HUMAN IMMUNODEFICIENCY VIRUS: Primary | ICD-10-CM

## 2021-10-27 DIAGNOSIS — Z20.822 COVID-19 RULED OUT: ICD-10-CM

## 2021-10-27 PROCEDURE — U0003 INFECTIOUS AGENT DETECTION BY NUCLEIC ACID (DNA OR RNA); SEVERE ACUTE RESPIRATORY SYNDROME CORONAVIRUS 2 (SARS-COV-2) (CORONAVIRUS DISEASE [COVID-19]), AMPLIFIED PROBE TECHNIQUE, MAKING USE OF HIGH THROUGHPUT TECHNOLOGIES AS DESCRIBED BY CMS-2020-01-R: HCPCS | Mod: ZL

## 2021-10-27 PROCEDURE — C9803 HOPD COVID-19 SPEC COLLECT: HCPCS

## 2021-10-29 ENCOUNTER — TELEPHONE (OUTPATIENT)
Dept: FAMILY MEDICINE | Facility: OTHER | Age: 66
End: 2021-10-29

## 2021-10-29 LAB — SARS-COV-2 RNA RESP QL NAA+PROBE: NEGATIVE

## 2021-10-29 NOTE — TELEPHONE ENCOUNTER
Called and informed patient of negative COVID results and patient verbalized understanding.    Jessica Yoder RN on 10/29/2021 at 3:47 PM

## 2021-11-22 ENCOUNTER — IMMUNIZATION (OUTPATIENT)
Dept: FAMILY MEDICINE | Facility: OTHER | Age: 66
End: 2021-11-22
Attending: FAMILY MEDICINE
Payer: COMMERCIAL

## 2021-11-22 PROCEDURE — 91300 PR COVID VAC PFIZER DIL RECON 30 MCG/0.3 ML IM: CPT

## 2022-01-18 ENCOUNTER — LAB REQUISITION (OUTPATIENT)
Dept: LAB | Facility: CLINIC | Age: 67
End: 2022-01-18

## 2022-01-18 PROCEDURE — U0005 INFEC AGEN DETEC AMPLI PROBE: HCPCS | Performed by: INTERNAL MEDICINE

## 2022-01-19 LAB — SARS-COV-2 RNA RESP QL NAA+PROBE: POSITIVE

## 2022-01-25 PROCEDURE — U0003 INFECTIOUS AGENT DETECTION BY NUCLEIC ACID (DNA OR RNA); SEVERE ACUTE RESPIRATORY SYNDROME CORONAVIRUS 2 (SARS-COV-2) (CORONAVIRUS DISEASE [COVID-19]), AMPLIFIED PROBE TECHNIQUE, MAKING USE OF HIGH THROUGHPUT TECHNOLOGIES AS DESCRIBED BY CMS-2020-01-R: HCPCS | Performed by: INTERNAL MEDICINE

## 2022-01-26 ENCOUNTER — LAB REQUISITION (OUTPATIENT)
Dept: LAB | Facility: CLINIC | Age: 67
End: 2022-01-26

## 2022-01-26 LAB — SARS-COV-2 RNA RESP QL NAA+PROBE: NEGATIVE

## 2022-03-15 ENCOUNTER — LAB REQUISITION (OUTPATIENT)
Dept: LAB | Facility: CLINIC | Age: 67
End: 2022-03-15

## 2022-03-15 PROCEDURE — U0003 INFECTIOUS AGENT DETECTION BY NUCLEIC ACID (DNA OR RNA); SEVERE ACUTE RESPIRATORY SYNDROME CORONAVIRUS 2 (SARS-COV-2) (CORONAVIRUS DISEASE [COVID-19]), AMPLIFIED PROBE TECHNIQUE, MAKING USE OF HIGH THROUGHPUT TECHNOLOGIES AS DESCRIBED BY CMS-2020-01-R: HCPCS | Performed by: INTERNAL MEDICINE

## 2022-03-16 LAB — SARS-COV-2 RNA RESP QL NAA+PROBE: NEGATIVE

## 2022-06-15 NOTE — PATIENT INSTRUCTIONS
PIV removed. Tip intact. Dressing in place. Tele box removed. [facility] notified of pt discharge. Discharge paperwork went over with and given to pt. Verbalizes understanding. Pt lock box emptied. Pt wheeled via wheelchair to private car with all belongings. Pt discharge home in stable condition. Stop extra lisinopril  Continue lisinopril with hydrochlorothiazide  Start amlodipine 5 mg daily  Return in 2 weeks for recheck

## 2022-08-22 DIAGNOSIS — I10 ESSENTIAL HYPERTENSION: ICD-10-CM

## 2022-08-23 NOTE — TELEPHONE ENCOUNTER
[facility] sent Rx request for the following:      Requested Prescriptions   Pending Prescriptions Disp Refills     amLODIPine (NORVASC) 10 MG tablet [Pharmacy Med Name: amlodipine 10 mg tablet] 90 tablet 3     Sig: Take 1 tablet (10 mg) by mouth daily   Last Prescription Date:   6/29/21  Last Fill Qty/Refills:         90, R-3    Calcium Channel Blockers Protocol  Failed - 8/23/2022  2:44 PM        Failed - Blood pressure under 140/90 in past 12 months     BP Readings from Last 3 Encounters:   06/29/21 138/80   06/11/21 (!) 150/92   05/27/21 (!) 160/110           Failed - Normal serum creatinine on file in past 12 months     Recent Labs   Lab Test 04/16/21  1436   CR 1.03           lisinopril-hydrochlorothiazide (ZESTORETIC) 20-25 MG tablet [Pharmacy Med Name: lisinopril 20 mg-hydrochlorothiazide 25 mg tablet] 90 tablet 3     Sig: Take 1 tablet by mouth daily   Last Prescription Date:   6/11/21  Last Fill Qty/Refills:         90, R-3    Diuretics (Including Combos) Protocol Failed - 8/23/2022  2:44 PM        Failed - Blood pressure under 140/90 in past 12 months     BP Readings from Last 3 Encounters:   06/29/21 138/80   06/11/21 (!) 150/92   05/27/21 (!) 160/110           Failed - Normal serum creatinine on file in past 12 months     Recent Labs   Lab Test 04/16/21  1436   CR 1.03           Failed - Normal serum potassium on file in past 12 months     Recent Labs   Lab Test 04/16/21  1436   POTASSIUM 4.0           Failed - Normal serum sodium on file in past 12 months     Recent Labs   Lab Test 04/16/21  1436          ACE Inhibitors (Including Combos) Protocol Failed - 8/23/2022  2:44 PM        Failed - Blood pressure under 140/90 in past 12 months     BP Readings from Last 3 Encounters:   06/29/21 138/80   06/11/21 (!) 150/92   05/27/21 (!) 160/110           Failed - Normal serum creatinine on file in past 12 months     Recent Labs   Lab Test 04/16/21  1436   CR 1.03           Failed - Normal  serum potassium on file in past 12 months     Recent Labs   Lab Test 04/16/21  1436   POTASSIUM 4.0        Last Office Visit:              6/29/21   Future Office visit:           None    Pt due for annual exam. Routing to provider for refill consideration. Routing to  OUTREACH APPT REQUESTS pool, to assist Pt in scheduling appointment.     Afia Roberts RN .............. 8/23/2022  2:46 PM

## 2022-08-25 RX ORDER — LISINOPRIL AND HYDROCHLOROTHIAZIDE 20; 25 MG/1; MG/1
1 TABLET ORAL DAILY
Qty: 90 TABLET | Refills: 0 | Status: SHIPPED | OUTPATIENT
Start: 2022-08-25 | End: 2022-10-05

## 2022-08-25 RX ORDER — AMLODIPINE BESYLATE 10 MG/1
10 TABLET ORAL DAILY
Qty: 90 TABLET | Refills: 0 | Status: SHIPPED | OUTPATIENT
Start: 2022-08-25 | End: 2022-10-05

## 2022-08-25 NOTE — TELEPHONE ENCOUNTER
Please reach out to patient and assist with scheduling routine physical including fasting lab work. BARBARA Fernandez CNP on 8/25/2022 at 7:59 AM

## 2022-08-25 NOTE — TELEPHONE ENCOUNTER
Call attempted to schedule appointment. Voice mailbox full.    Needs annual physical and fasting labs per DMO.    Chasity Michelle on 8/25/2022 at 3:05 PM

## 2022-10-05 ENCOUNTER — OFFICE VISIT (OUTPATIENT)
Dept: FAMILY MEDICINE | Facility: OTHER | Age: 67
End: 2022-10-05
Attending: NURSE PRACTITIONER
Payer: COMMERCIAL

## 2022-10-05 VITALS
BODY MASS INDEX: 30.95 KG/M2 | SYSTOLIC BLOOD PRESSURE: 132 MMHG | RESPIRATION RATE: 18 BRPM | HEART RATE: 72 BPM | OXYGEN SATURATION: 97 % | DIASTOLIC BLOOD PRESSURE: 80 MMHG | WEIGHT: 192.6 LBS | HEIGHT: 66 IN | TEMPERATURE: 98 F

## 2022-10-05 DIAGNOSIS — I10 ESSENTIAL HYPERTENSION: ICD-10-CM

## 2022-10-05 DIAGNOSIS — Z12.11 COLON CANCER SCREENING: ICD-10-CM

## 2022-10-05 DIAGNOSIS — Z00.00 ROUTINE HISTORY AND PHYSICAL EXAMINATION OF ADULT: Primary | ICD-10-CM

## 2022-10-05 DIAGNOSIS — S46.212A BICEPS RUPTURE, DISTAL, LEFT, INITIAL ENCOUNTER: ICD-10-CM

## 2022-10-05 DIAGNOSIS — Z13.6 ENCOUNTER FOR ABDOMINAL AORTIC ANEURYSM (AAA) SCREENING: ICD-10-CM

## 2022-10-05 DIAGNOSIS — Z13.220 LIPID SCREENING: ICD-10-CM

## 2022-10-05 DIAGNOSIS — Z12.5 SCREENING FOR PROSTATE CANCER: ICD-10-CM

## 2022-10-05 LAB
ALBUMIN SERPL-MCNC: 4.5 G/DL (ref 3.5–5.7)
ALP SERPL-CCNC: 58 U/L (ref 34–104)
ALT SERPL W P-5'-P-CCNC: 13 U/L (ref 7–52)
ANION GAP SERPL CALCULATED.3IONS-SCNC: 8 MMOL/L (ref 3–14)
AST SERPL W P-5'-P-CCNC: 17 U/L (ref 13–39)
BILIRUB SERPL-MCNC: 0.5 MG/DL (ref 0.3–1)
BUN SERPL-MCNC: 22 MG/DL (ref 7–25)
CALCIUM SERPL-MCNC: 9.6 MG/DL (ref 8.6–10.3)
CHLORIDE BLD-SCNC: 105 MMOL/L (ref 98–107)
CHOLEST SERPL-MCNC: 217 MG/DL
CO2 SERPL-SCNC: 27 MMOL/L (ref 21–31)
CREAT SERPL-MCNC: 1.2 MG/DL (ref 0.7–1.3)
FASTING STATUS PATIENT QL REPORTED: YES
GFR SERPL CREATININE-BSD FRML MDRD: 67 ML/MIN/1.73M2
GLUCOSE BLD-MCNC: 99 MG/DL (ref 70–105)
HDLC SERPL-MCNC: 48 MG/DL (ref 23–92)
LDLC SERPL CALC-MCNC: 155 MG/DL
NONHDLC SERPL-MCNC: 169 MG/DL
POTASSIUM BLD-SCNC: 4 MMOL/L (ref 3.5–5.1)
PROT SERPL-MCNC: 7.5 G/DL (ref 6.4–8.9)
PSA SERPL-MCNC: 1.89 UG/L (ref 0–4)
SODIUM SERPL-SCNC: 140 MMOL/L (ref 134–144)
TRIGL SERPL-MCNC: 71 MG/DL

## 2022-10-05 PROCEDURE — 91312 COVID-19,PF,PFIZER BOOSTER BIVALENT: CPT

## 2022-10-05 PROCEDURE — 83718 ASSAY OF LIPOPROTEIN: CPT | Mod: ZL | Performed by: NURSE PRACTITIONER

## 2022-10-05 PROCEDURE — 36415 COLL VENOUS BLD VENIPUNCTURE: CPT | Mod: ZL | Performed by: NURSE PRACTITIONER

## 2022-10-05 PROCEDURE — G0008 ADMIN INFLUENZA VIRUS VAC: HCPCS

## 2022-10-05 PROCEDURE — 99212 OFFICE O/P EST SF 10 MIN: CPT | Mod: 25 | Performed by: NURSE PRACTITIONER

## 2022-10-05 PROCEDURE — G0463 HOSPITAL OUTPT CLINIC VISIT: HCPCS | Mod: 25

## 2022-10-05 PROCEDURE — G0103 PSA SCREENING: HCPCS | Mod: ZL | Performed by: NURSE PRACTITIONER

## 2022-10-05 PROCEDURE — 99397 PER PM REEVAL EST PAT 65+ YR: CPT | Performed by: NURSE PRACTITIONER

## 2022-10-05 PROCEDURE — 84460 ALANINE AMINO (ALT) (SGPT): CPT | Mod: ZL | Performed by: NURSE PRACTITIONER

## 2022-10-05 PROCEDURE — 80053 COMPREHEN METABOLIC PANEL: CPT | Mod: ZL | Performed by: NURSE PRACTITIONER

## 2022-10-05 RX ORDER — LISINOPRIL AND HYDROCHLOROTHIAZIDE 20; 25 MG/1; MG/1
1 TABLET ORAL DAILY
Qty: 90 TABLET | Refills: 3 | Status: SHIPPED | OUTPATIENT
Start: 2022-10-05 | End: 2023-11-09

## 2022-10-05 RX ORDER — AMLODIPINE BESYLATE 10 MG/1
10 TABLET ORAL DAILY
Qty: 90 TABLET | Refills: 3 | Status: SHIPPED | OUTPATIENT
Start: 2022-10-05 | End: 2023-11-28

## 2022-10-05 ASSESSMENT — ENCOUNTER SYMPTOMS
FREQUENCY: 0
DIARRHEA: 0
DIZZINESS: 0
COUGH: 0
ARTHRALGIAS: 0
MYALGIAS: 0
HEARTBURN: 0
WEAKNESS: 0
HEADACHES: 0
PALPITATIONS: 0
FEVER: 0
NAUSEA: 0
ABDOMINAL PAIN: 0
JOINT SWELLING: 0
DYSURIA: 0
HEMATURIA: 0
NERVOUS/ANXIOUS: 0
HEMATOCHEZIA: 0
CONSTIPATION: 0
CHILLS: 0
SHORTNESS OF BREATH: 0
EYE PAIN: 0
PARESTHESIAS: 0
SORE THROAT: 0

## 2022-10-05 ASSESSMENT — ACTIVITIES OF DAILY LIVING (ADL): CURRENT_FUNCTION: NO ASSISTANCE NEEDED

## 2022-10-05 ASSESSMENT — PAIN SCALES - GENERAL: PAINLEVEL: NO PAIN (0)

## 2022-10-05 NOTE — NURSING NOTE
Patient presents today for annual physical.    Medication Reconciliation Complete    Sangeeta Mcnally LPN  10/5/2022 10:02 AM

## 2022-10-05 NOTE — PATIENT INSTRUCTIONS
Cologuard Patient Instructions    You received an order for a Cologuard test. You will receive your kit in the mail. Please follow the instructions that are provided in the kit.      Reminder: Ship Cologuard test the same day or the next day to allow enough delivery time. The lab must receive your specimen within 4 days for successful testing. If not delivered in time, you may have to complete the process again.    Once you have completed the collection and have it ready to be shipped, bring it to one of the following sites listed below. *Note: none of the sites ship out later than mid-morning. Please do not drop off Fridays, as they will not go out until Monday which will make your specimen inacceptable.     - [facility] ([address], [place] [address])      Drop off: Monday-Thursday, 8:00am-4:30pm at the [address] check-in desk      - [facility] ([address], [place] [address])   Drop off: Monday-Thursday, 8:00am-5:45pm     - [facility] ([address], [place] [address])     Drop off: Monday-Thursday, 3:00pm-5:30pm

## 2022-10-05 NOTE — PROGRESS NOTES
"SUBJECTIVE:   CC: Gallo is an 67 year old who presents for preventative health visit.       Patient has been advised of split billing requirements and indicates understanding: Yes  Healthy Habits:     In general, how would you rate your overall health?  Excellent    Frequency of exercise:  4-5 days/week    Duration of exercise:  45-60 minutes    Do you usually eat at least 4 servings of fruit and vegetables a day, include whole grains    & fiber and avoid regularly eating high fat or \"junk\" foods?  Yes    Taking medications regularly:  Yes    Medication side effects:  None    Ability to successfully perform activities of daily living:  No assistance needed    Home Safety:  No safety concerns identified    Hearing Impairment:  No hearing concerns    In the past 6 months, have you been bothered by leaking of urine?  No    In general, how would you rate your overall mental or emotional health?  Excellent      PHQ-2 Total Score: 0    Additional concerns today:  No      He reports summer 2021 he had a continue on the top of his truck it was sliding, he reached his left arm up to grab this, his arm started to fall backwards and so he let go.  About 2 months later he noted deformity to his left bicep.  He denies any decreased strength or pain but wanted me to be aware of this.    Hypertension Follow-up      Do you check your blood pressure regularly outside of the clinic? No     Are you following a low salt diet? Yes    Are your blood pressures ever more than 140 on the top number (systolic) OR more   than 90 on the bottom number (diastolic), for example 140/90? No      Today's PHQ-2 Score:   PHQ-2 ( 1999 Pf[employer]) 10/5/2022   Q1: Little interest or pleasure in doing things 0   Q2: Feeling down, depressed or hopeless 0   PHQ-2 Score 0   PHQ-2 Total Score (12-17 Years)- Positive if 3 or more points; Administer PHQ-A if positive -   Q1: Little interest or pleasure in doing things Not at all   Q2: Feeling down, depressed or " hopeless Not at all   PHQ-2 Score 0       Abuse: Current or Past(Physical, Sexual or Emotional)- No  Do you feel safe in your environment? Yes      Social History     Tobacco Use     Smoking status: Never Smoker     Smokeless tobacco: Former User   Substance Use Topics     Alcohol use: Not Currently         Alcohol Use 10/5/2022   Prescreen: >3 drinks/day or >7 drinks/week? Not Applicable       Last PSA:   PSA   Date Value Ref Range Status   01/15/2007 1.43 0 - 4 ug/L Final       Reviewed orders with patient. Reviewed health maintenance and updated orders accordingly - Yes  BP Readings from Last 3 Encounters:   10/05/22 132/80   06/29/21 138/80   06/11/21 (!) 150/92    Wt Readings from Last 3 Encounters:   10/05/22 87.4 kg (192 lb 9.6 oz)   06/29/21 89.8 kg (198 lb)   06/11/21 88.9 kg (196 lb)                  Patient Active Problem List   Diagnosis     CARDIOVASCULAR SCREENING; LDL GOAL LESS THAN 160     2019 novel coronavirus disease (COVID-19)     Essential hypertension     Past Surgical History:   Procedure Laterality Date     cyst removed      left wrist     HCL SQUAMOUS CELL CARCINOMA AG      left shoulder-excision       Social History     Tobacco Use     Smoking status: Never Smoker     Smokeless tobacco: Former User   Substance Use Topics     Alcohol use: Not Currently     Family History   Problem Relation Age of Onset     Melanoma Mother      Skin Cancer Father      Prostate Cancer Father      Melanoma Sister      Hernia Brother          Current Outpatient Medications   Medication Sig Dispense Refill     amLODIPine (NORVASC) 10 MG tablet Take 1 tablet (10 mg) by mouth daily 90 tablet 3     lisinopril-hydrochlorothiazide (ZESTORETIC) 20-25 MG tablet Take 1 tablet by mouth daily 90 tablet 3     Allergies   Allergen Reactions     No Known Drug Allergies        Reviewed and updated as needed this visit by clinical staff   Tobacco  Allergies  Meds      Soc Hx          Reviewed and updated as needed this visit  "by Provider                   Past Medical History:   Diagnosis Date     2019 novel coronavirus disease (COVID-19) 1/4/2021 2019 novel coronavirus disease (COVID-19) 1/4/2021     Squamous cell carcinoma of skin 11/27/2015      Past Surgical History:   Procedure Laterality Date     cyst removed      left wrist     HCL SQUAMOUS CELL CARCINOMA AG      left shoulder-excision       Review of Systems   Constitutional: Negative for chills and fever.   HENT: Negative for congestion, ear pain, hearing loss and sore throat.    Eyes: Negative for pain and visual disturbance.   Respiratory: Negative for cough and shortness of breath.    Cardiovascular: Negative for chest pain, palpitations and peripheral edema.   Gastrointestinal: Negative for abdominal pain, constipation, diarrhea, heartburn, hematochezia and nausea.   Genitourinary: Negative for dysuria, frequency, genital sores, hematuria, impotence, penile discharge and urgency.   Musculoskeletal: Negative for arthralgias, joint swelling and myalgias.   Skin: Negative for rash.   Neurological: Negative for dizziness, weakness, headaches and paresthesias.   Psychiatric/Behavioral: Negative for mood changes. The patient is not nervous/anxious.        OBJECTIVE:   /80   Pulse 72   Temp 98  F (36.7  C)   Resp 18   Ht 1.676 m (5' 6\")   Wt 87.4 kg (192 lb 9.6 oz)   SpO2 97%   BMI 31.09 kg/m      Physical Exam  GENERAL: healthy, alert and no distress  EYES: Eyes grossly normal to inspection, PERRL and conjunctivae and sclerae normal  HENT: ear canals and TM's normal, nose and mouth without ulcers or lesions  NECK: no adenopathy, no asymmetry, masses, or scars and thyroid normal to palpation  RESP: lungs clear to auscultation - no rales, rhonchi or wheezes  CV: regular rate and rhythm, normal S1 S2, no S3 or S4, no murmur, click or rub, no peripheral edema and peripheral pulses strong  ABDOMEN: soft, nontender, no hepatosplenomegaly, no masses and bowel sounds " "normal  MS: no gross musculoskeletal defects noted, left bicep with shortening at distal aspect  SKIN: no suspicious lesions or rashes  NEURO: Normal strength and tone, mentation intact and speech normal  PSYCH: mentation appears normal, affect normal/bright        ASSESSMENT/PLAN:       ICD-10-CM    1. Routine history and physical examination of adult  Z00.00    2. Essential hypertension  I10 Comprehensive Metabolic Panel     amLODIPine (NORVASC) 10 MG tablet     lisinopril-hydrochlorothiazide (ZESTORETIC) 20-25 MG tablet   3. Lipid screening  Z13.220 Lipid Panel   4. Screening for prostate cancer  Z12.5 PSA Screen GH   5. Colon cancer screening  Z12.11 COLOGUARD(EXACT SCIENCES)   6. Encounter for abdominal aortic aneurysm (AAA) screening  Z13.6 US Aorta Medicare AAA Screening   7. Biceps rupture, distal, left, initial encounter  S46.212A      Influenza and COVID vaccines updated today.  Declines consideration of shingles vaccine at this time  CMP and lipid panel as well as PSA pending.  Blood pressures under good control.  Refilled amlodipine and lisinopril with hydrochlorothiazide x1 year.  Cologuard ordered for colon cancer screening  Aorta ultrasound screening ordered  Distal bicep rupture on the left, he declines imaging or further evaluation at this time.    Patient has been advised of split billing requirements and indicates understanding: Yes    COUNSELING:   Reviewed preventive health counseling, as reflected in patient instructions       Consider AAA screening for ages 65-75 and > 100 cig smoking history or family history of AAA       Regular exercise       Healthy diet/nutrition       Immunizations    Vaccinated for: Influenza  COVID           Colorectal cancer screening       Prostate cancer screening    Estimated body mass index is 31.09 kg/m  as calculated from the following:    Height as of this encounter: 1.676 m (5' 6\").    Weight as of this encounter: 87.4 kg (192 lb 9.6 oz).     Weight management " plan: Discussed healthy diet and exercise guidelines    He reports that he has never smoked. He has quit using smokeless tobacco.      Counseling Resources:  ATP IV Guidelines  Pooled Cohorts Equation Calculator  FRAX Risk Assessment  ICSI Preventive Guidelines  Dietary Guidelines for Americans, 2010  USDA's MyPlate  ASA Prophylaxis  Lung CA Screening    BARBARA Fernandez [facility] AND [facility]

## 2022-10-05 NOTE — LETTER
October 6, 2022      Gallo Holliday  [address] [address]        Dear ,    We are writing to inform you of your test results.    Your test results fall within the expected range(s) or remain unchanged from previous results.  Please continue with current treatment plan.  Your cholesterol has climbed a little bit.  Continue to work on heart healthy diet and regular exercise.  Prostate levels were normal and no signs of diabetes.    Resulted Orders   PSA Screen GH   Result Value Ref Range    Prostate Specific Antigen Screen 1.89 0.00 - 4.00 ug/L    Narrative    The DXI Access PSAS WHO assay is a two site immunoenzymatic   assay. Assay values obtained with different assay methods cannot be used   interchangeably due to differences in assay methods and reagent specificity.   Lipid Panel   Result Value Ref Range    Cholesterol 217 (H) <200 mg/dL    Triglycerides 71 <150 mg/dL    Direct Measure HDL 48 23 - 92 mg/dL    LDL Cholesterol Calculated 155 (H) <=100 mg/dL    Non HDL Cholesterol 169 (H) <130 mg/dL    Patient Fasting > 8hrs? Yes     Narrative    Cholesterol  Desirable:  <200 mg/dL    Triglycerides  Normal:  Less than 150 mg/dL  Borderline High:  150-199 mg/dL  High:  200-499 mg/dL  Very High:  Greater than or equal to 500 mg/dL    Direct Measure HDL  Female:  Greater than or equal to 50 mg/dL   Male:  Greater than or equal to 40 mg/dL    LDL Cholesterol  Desirable:  <100mg/dL  Above Desirable:  100-129 mg/dL   Borderline High:  130-159 mg/dL   High:  160-189 mg/dL   Very High:  >= 190 mg/dL    Non HDL Cholesterol  Desirable:  130 mg/dL  Above Desirable:  130-159 mg/dL  Borderline High:  160-189 mg/dL  High:  190-219 mg/dL  Very High:  Greater than or equal to 220 mg/dL   Comprehensive Metabolic Panel   Result Value Ref Range    Sodium 140 134 - 144 mmol/L    Potassium 4.0 3.5 - 5.1 mmol/L    Chloride 105 98 - 107 mmol/L    Carbon Dioxide (CO2) 27 21 - 31 mmol/L    Anion Gap 8 3 - 14  mmol/L    Urea Nitrogen 22 7 - 25 mg/dL    Creatinine 1.20 0.70 - 1.30 mg/dL    Calcium 9.6 8.6 - 10.3 mg/dL    Glucose 99 70 - 105 mg/dL    Alkaline Phosphatase 58 34 - 104 U/L    AST 17 13 - 39 U/L    ALT 13 7 - 52 U/L    Protein Total 7.5 6.4 - 8.9 g/dL    Albumin 4.5 3.5 - 5.7 g/dL    Bilirubin Total 0.5 0.3 - 1.0 mg/dL    GFR Estimate 67 >60 mL/min/1.73m2      Comment:      Effective December 21, 2021 eGFRcr in adults is calculated using the 2021 CKD-EPI creatinine equation which includes age and gender (Natanael et al., NEJM, DOI: 10.1056/ZUZYfj7903355)       If you have any questions or concerns, please call the clinic at the number listed above.       Sincerely,      BARBARA Ramos CNP

## 2023-03-28 ENCOUNTER — HOSPITAL ENCOUNTER (OUTPATIENT)
Dept: ULTRASOUND IMAGING | Facility: OTHER | Age: 68
Discharge: HOME | End: 2023-03-28
Attending: NURSE PRACTITIONER | Admitting: NURSE PRACTITIONER
Payer: MEDICARE

## 2023-03-28 DIAGNOSIS — Z13.6 ENCOUNTER FOR ABDOMINAL AORTIC ANEURYSM (AAA) SCREENING: ICD-10-CM

## 2023-03-28 PROCEDURE — 76706 US ABDL AORTA SCREEN AAA: CPT

## 2023-06-22 ENCOUNTER — OFFICE VISIT (OUTPATIENT)
Dept: FAMILY MEDICINE | Facility: OTHER | Age: 68
End: 2023-06-22
Attending: NURSE PRACTITIONER
Payer: MEDICARE

## 2023-06-22 VITALS
RESPIRATION RATE: 16 BRPM | DIASTOLIC BLOOD PRESSURE: 80 MMHG | BODY MASS INDEX: 30.82 KG/M2 | WEIGHT: 191.8 LBS | HEIGHT: 66 IN | SYSTOLIC BLOOD PRESSURE: 122 MMHG | TEMPERATURE: 98.2 F | HEART RATE: 75 BPM | OXYGEN SATURATION: 95 %
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
## 2023-06-22 PROCEDURE — G0463 HOSPITAL OUTPT CLINIC VISIT: HCPCS

## 2023-06-22 PROCEDURE — 99213 OFFICE O/P EST LOW 20 MIN: CPT | Performed by: NURSE PRACTITIONER

## 2023-06-22 RX ORDER — DOXYCYCLINE 100 MG/1
200 CAPSULE ORAL ONCE
Qty: 2 CAPSULE | Refills: 3 | Status: SHIPPED | OUTPATIENT
Start: 2023-06-22 | End: 2023-06-22

## 2023-06-22 ASSESSMENT — PAIN SCALES - GENERAL: PAINLEVEL: NO PAIN (0)

## 2023-06-22 NOTE — PROGRESS NOTES
ASSESSMENT/PLAN:     I have reviewed the nursing notes.  I have reviewed the findings, diagnosis, plan and need for follow up with the patient.          1. Tick bite of multiple sites    - doxycycline hyclate (VIBRAMYCIN) 100 MG capsule; Take 2 capsules (200 mg) by mouth once for 1 dose  Dispense: 2 capsule; Refill: 3    Patient [place] so frequent exposure to tick bites.  Currently with 2 known deer tick bites in the past 48 hours.    Discussed with patient tick education including lyme disease and anaplasmosis, prevention of tick bites including daily tick checks and use of DEET or permethrin on boots and lower pant legs, and CDC guidelines for prophylactic tick treatment.    Doxycycline 200 mg one time for prophylactic treatment of Lyme's Disease.    Wash tick bite with soap and water. Apply antibiotic ointment to site 1-2 times daily until healed.    Discussed warning signs/symptoms indicative of need to f/u  Follow up if symptoms persist or worsen or concerns      I explained my diagnostic considerations and recommendations to the patient, who voiced understanding and agreement with the treatment plan. All questions were answered. We discussed potential side effects of any prescribed or recommended therapies, as well as expectations for response to treatments.    Rehana Pelletier NP  [facility] AND [facility]      SUBJECTIVE:   Gallo Holliday is a 67 year old male who presents to clinic today for the following health issues:  Tick bite    HPI  Patient reports finding an attached tick on his left leg 2 days ago on his left thigh.  Yesterday morning noted 2 spots on his leg, second one also was a deer tick bite.  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  He has been doing daily tick checks.  No fevers.  No current symptoms of tick illness such as joint aches or swelling, fatigue, headaches, rash,  "fevers, etc  No known hx of prior tick illness      Past Medical History:   Diagnosis Date     2019 novel coronavirus disease (COVID-19) 1/4/2021     2019 novel coronavirus disease (COVID-19) 1/4/2021     Squamous cell carcinoma of skin 11/27/2015     Past Surgical History:   Procedure Laterality Date     cyst removed      left wrist     HCL SQUAMOUS CELL CARCINOMA AG      left shoulder-excision     Social History     Tobacco Use     Smoking status: Never     Smokeless tobacco: Former   Substance Use Topics     Alcohol use: Not Currently     Current Outpatient Medications   Medication Sig Dispense Refill     amLODIPine (NORVASC) 10 MG tablet Take 1 tablet (10 mg) by mouth daily 90 tablet 3     lisinopril-hydrochlorothiazide (ZESTORETIC) 20-25 MG tablet Take 1 tablet by mouth daily 90 tablet 3     Allergies   Allergen Reactions     No Known Drug Allergy          Past medical history, past surgical history, current medications and allergies reviewed and accurate to the best of my knowledge.        OBJECTIVE:     /80 (BP Location: Left arm, Patient Position: Sitting, Cuff Size: Adult Large)   Pulse 75   Temp 98.2  F (36.8  C) (Tympanic)   Resp 16   Ht 1.664 m (5' 5.5\")   Wt 87 kg (191 lb 12.8 oz)   SpO2 95%   BMI 31.43 kg/m    Body mass index is 31.43 kg/m .     Physical Exam  General Appearance: Well appearing adult male, appropriate appearance for age. No acute distress  Respiratory: normal chest wall and respirations.  Normal effort. No cough appreciated.  Musculoskeletal:  Equal movement of bilateral upper extremities.  Equal movement of bilateral lower extremities.  Normal gait.    Dermatological:  Left distal medial thigh with single erythematous circular scabbed lesion and left medial knee with circular lesion with dark brown center and dark erythematous ring - both identified as areas of tick bites  Psychological: normal affect, alert, oriented, and pleasant.       "

## 2023-06-22 NOTE — NURSING NOTE
"Pt presents to  for tick bite on L leg. Pt found tick on his leg on Tues night and woke up with two different spots on his L leg on Wednesday morning.    Chief Complaint   Patient presents with     Tick Bite     L leg       FOOD SECURITY SCREENING QUESTIONS  Hunger Vital Signs:  Within the past 12 months we worried whether our food would run out before we got money to buy more. Never  Within the past 12 months the food we bought just didn't last and we didn't have money to get more. Never  Afia Deaskyon 6/22/2023 3:29 PM      Initial /80 (BP Location: Left arm, Patient Position: Sitting, Cuff Size: Adult Large)   Pulse 75   Temp 98.2  F (36.8  C) (Tympanic)   Resp 16   Ht 1.664 m (5' 5.5\")   Wt 87 kg (191 lb 12.8 oz)   SpO2 95%   BMI 31.43 kg/m   Estimated body mass index is 31.43 kg/m  as calculated from the following:    Height as of this encounter: 1.664 m (5' 5.5\").    Weight as of this encounter: 87 kg (191 lb 12.8 oz).  Medication Reconciliation: complete    Afia López  "

## 2023-11-07 DIAGNOSIS — I10 ESSENTIAL HYPERTENSION: ICD-10-CM

## 2023-11-09 RX ORDER — LISINOPRIL AND HYDROCHLOROTHIAZIDE 20; 25 MG/1; MG/1
1 TABLET ORAL DAILY
Qty: 90 TABLET | Refills: 0 | Status: SHIPPED | OUTPATIENT
Start: 2023-11-09 | End: 2023-11-28

## 2023-11-28 ENCOUNTER — OFFICE VISIT (OUTPATIENT)
Dept: FAMILY MEDICINE | Facility: OTHER | Age: 68
End: 2023-11-28
Attending: NURSE PRACTITIONER
Payer: MEDICARE

## 2023-11-28 ENCOUNTER — LAB (OUTPATIENT)
Dept: FAMILY MEDICINE | Facility: OTHER | Age: 68
End: 2023-11-28

## 2023-11-28 VITALS
BODY MASS INDEX: 30.6 KG/M2 | OXYGEN SATURATION: 97 % | SYSTOLIC BLOOD PRESSURE: 138 MMHG | WEIGHT: 190.4 LBS | HEIGHT: 66 IN | HEART RATE: 84 BPM | DIASTOLIC BLOOD PRESSURE: 86 MMHG | RESPIRATION RATE: 18 BRPM

## 2023-11-28 DIAGNOSIS — Z12.11 COLON CANCER SCREENING: ICD-10-CM

## 2023-11-28 DIAGNOSIS — Z13.220 LIPID SCREENING: ICD-10-CM

## 2023-11-28 DIAGNOSIS — I10 ESSENTIAL HYPERTENSION: ICD-10-CM

## 2023-11-28 DIAGNOSIS — Z00.00 ENCOUNTER FOR MEDICARE ANNUAL WELLNESS EXAM: Primary | ICD-10-CM

## 2023-11-28 LAB
ALBUMIN SERPL BCG-MCNC: 4.7 G/DL (ref 3.5–5.2)
ALP SERPL-CCNC: 71 U/L (ref 40–150)
ALT SERPL W P-5'-P-CCNC: 14 U/L (ref 0–70)
ANION GAP SERPL CALCULATED.3IONS-SCNC: 11 MMOL/L (ref 7–15)
AST SERPL W P-5'-P-CCNC: 22 U/L (ref 0–45)
BILIRUB SERPL-MCNC: 0.5 MG/DL
BUN SERPL-MCNC: 16.4 MG/DL (ref 8–23)
CALCIUM SERPL-MCNC: 9.3 MG/DL (ref 8.8–10.2)
CHLORIDE SERPL-SCNC: 103 MMOL/L (ref 98–107)
CHOLEST SERPL-MCNC: 192 MG/DL
CREAT SERPL-MCNC: 0.95 MG/DL (ref 0.67–1.17)
DEPRECATED HCO3 PLAS-SCNC: 26 MMOL/L (ref 22–29)
EGFRCR SERPLBLD CKD-EPI 2021: 88 ML/MIN/1.73M2
GLUCOSE SERPL-MCNC: 99 MG/DL (ref 70–99)
HDLC SERPL-MCNC: 50 MG/DL
LDLC SERPL CALC-MCNC: 130 MG/DL
NONHDLC SERPL-MCNC: 142 MG/DL
POTASSIUM SERPL-SCNC: 4.1 MMOL/L (ref 3.4–5.3)
PROT SERPL-MCNC: 7.5 G/DL (ref 6.4–8.3)
SODIUM SERPL-SCNC: 140 MMOL/L (ref 135–145)
TRIGL SERPL-MCNC: 59 MG/DL

## 2023-11-28 PROCEDURE — G0008 ADMIN INFLUENZA VIRUS VAC: HCPCS

## 2023-11-28 PROCEDURE — 80061 LIPID PANEL: CPT | Mod: ZL | Performed by: NURSE PRACTITIONER

## 2023-11-28 PROCEDURE — 36415 COLL VENOUS BLD VENIPUNCTURE: CPT | Mod: ZL | Performed by: NURSE PRACTITIONER

## 2023-11-28 PROCEDURE — G0439 PPPS, SUBSEQ VISIT: HCPCS | Performed by: NURSE PRACTITIONER

## 2023-11-28 PROCEDURE — 80053 COMPREHEN METABOLIC PANEL: CPT | Mod: ZL | Performed by: NURSE PRACTITIONER

## 2023-11-28 PROCEDURE — G0463 HOSPITAL OUTPT CLINIC VISIT: HCPCS | Mod: 25

## 2023-11-28 PROCEDURE — 99212 OFFICE O/P EST SF 10 MIN: CPT | Mod: 25 | Performed by: NURSE PRACTITIONER

## 2023-11-28 RX ORDER — AMLODIPINE BESYLATE 10 MG/1
10 TABLET ORAL DAILY
Qty: 90 TABLET | Refills: 3 | Status: SHIPPED | OUTPATIENT
Start: 2023-11-28

## 2023-11-28 RX ORDER — LISINOPRIL AND HYDROCHLOROTHIAZIDE 20; 25 MG/1; MG/1
1 TABLET ORAL DAILY
Qty: 90 TABLET | Refills: 3 | Status: SHIPPED | OUTPATIENT
Start: 2023-11-28

## 2023-11-28 ASSESSMENT — ENCOUNTER SYMPTOMS
PARESTHESIAS: 0
NAUSEA: 0
JOINT SWELLING: 0
FEVER: 0
ARTHRALGIAS: 0
PALPITATIONS: 0
SHORTNESS OF BREATH: 0
DYSURIA: 0
HEARTBURN: 0
WEAKNESS: 0
COUGH: 0
DIARRHEA: 0
MYALGIAS: 0
NERVOUS/ANXIOUS: 0
HEADACHES: 0
DIZZINESS: 0
HEMATURIA: 0
HEMATOCHEZIA: 0
EYE PAIN: 0
FREQUENCY: 0
CHILLS: 0
ABDOMINAL PAIN: 0
SORE THROAT: 0
CONSTIPATION: 0

## 2023-11-28 ASSESSMENT — PAIN SCALES - GENERAL: PAINLEVEL: NO PAIN (0)

## 2023-11-28 ASSESSMENT — ACTIVITIES OF DAILY LIVING (ADL): CURRENT_FUNCTION: NO ASSISTANCE NEEDED

## 2023-11-28 NOTE — NURSING NOTE
Patient presents today for annual medicare wellness visit.    Medication Reconciliation Complete    Sangeeta Mcnally LPN  11/28/2023 1:27 PM

## 2023-11-28 NOTE — PATIENT INSTRUCTIONS
Patient Education   Personalized Prevention Plan  You are due for the preventive services outlined below.  Your care team is available to assist you in scheduling these services.  If you have already completed any of these items, please share that information with your care team to update in your medical record.  Health Maintenance Due   Topic Date Due     Zoster (Shingles) Vaccine (1 of 2) Never done     RSV VACCINE (Pregnancy & 60+) (1 - 1-dose 60+ series) Never done     Pneumococcal Vaccine (1 - PCV) Never done     Colorectal Cancer Screening  12/17/2022     Diptheria Tetanus Pertussis (DTAP/TDAP/TD) Vaccine (3 - Td or Tdap) 01/22/2023     Flu Vaccine (1) 09/01/2023     COVID-19 Vaccine (5 - 2023-24 season) 09/01/2023        Patient Education   Personalized Prevention Plan  You are due for the preventive services outlined below.  Your care team is available to assist you in scheduling these services.  If you have already completed any of these items, please share that information with your care team to update in your medical record.  Health Maintenance Due   Topic Date Due     Zoster (Shingles) Vaccine (1 of 2) Never done     RSV VACCINE (Pregnancy & 60+) (1 - 1-dose 60+ series) Never done     Pneumococcal Vaccine (1 - PCV) Never done     Colorectal Cancer Screening  12/17/2022     Diptheria Tetanus Pertussis (DTAP/TDAP/TD) Vaccine (3 - Td or Tdap) 01/22/2023     Flu Vaccine (1) 09/01/2023     COVID-19 Vaccine (5 - 2023-24 season) 09/01/2023     Learning About Dietary Guidelines  What are the Dietary Guidelines for Americans?     Dietary Guidelines for Americans provide tips for eating well and staying healthy. This helps reduce the risk for long-term (chronic) diseases.  These guidelines recommend that you:  Eat and drink the right amount for you. The U.S. government's food guide is called MyPlate. It can help you make your own well-balanced eating plan.  Try to balance your eating with your activity. This helps  "you stay at a healthy weight.  Drink alcohol in moderation, if at all.  Limit foods high in salt, saturated fat, trans fat, and added sugar.  These guidelines are from the U.S. Department of Agriculture and the U.S. Department of Health and Human Services. They are updated every 5 years.  What is MyPlate?  MyPlate is the U.S. government's food guide. It can help you make your own well-balanced eating plan. A balanced eating plan means that you eat enough, but not too much, and that your food gives you the nutrients you need to stay healthy.  MyPlate focuses on eating plenty of whole grains, fruits, and vegetables, and on limiting fat and sugar. It is available online at www.ChooseMyPlate.gov.  How can you get started?  If you're trying to eat healthier, you can slowly change your eating habits over time. You don't have to make big changes all at once. Start by adding one or two healthy foods to your meals each day.  Grains  Choose whole-grain breads and cereals and whole-wheat pasta and whole-grain crackers.  Vegetables  Eat a variety of vegetables every day. They have lots of nutrients and are part of a heart-healthy diet.  Fruits  Eat a variety of fruits every day. Fruits contain lots of nutrients. Choose fresh fruit instead of fruit juice.  Protein foods  Choose fish and lean poultry more often. Eat red meat and fried meats less often. Dried beans, tofu, and nuts are also good sources of protein.  Dairy  Choose low-fat or fat-free products from this food group. If you have problems digesting milk, try eating cheese or yogurt instead.  Fats and oils  Limit fats and oils if you're trying to cut calories. Choose healthy fats when you cook. These include canola oil and olive oil.  Where can you learn more?  Go to https://www.healthwise.net/patiented  Enter D676 in the search box to learn more about \"Learning About Dietary Guidelines.\"  Current as of: February 28, 2023               Content Version: 13.8    7081-5954 " Healthwise, [employer].   Care instructions adapted under license by your healthcare professional. If you have questions about a medical condition or this instruction, always ask your healthcare professional. [employer] disclaims any warranty or liability for your use of this information.

## 2023-11-28 NOTE — PROGRESS NOTES
"SUBJECTIVE:   Gallo is a 68 year old, presenting for the following:  Medicare Visit        Are you in the first 12 months of your Medicare coverage?  No    Healthy Habits:     In general, how would you rate your overall health?  Excellent    Frequency of exercise:  4-5 days/week    Duration of exercise:  30-45 minutes    Do you usually eat at least 4 servings of fruit and vegetables a day, include whole grains    & fiber and avoid regularly eating high fat or \"junk\" foods?  No    Taking medications regularly:  Yes    Medication side effects:  None    Ability to successfully perform activities of daily living:  No assistance needed    Home Safety:  No safety concerns identified    Hearing Impairment:  No hearing concerns    In the past 6 months, have you been bothered by leaking of urine?  No    In general, how would you rate your overall mental or emotional health?  Excellent    Additional concerns today:  Yes      Today's PHQ-2 Score:       11/28/2023     1:29 PM   PHQ-2 ( 1999 [employer])   Q1: Little interest or pleasure in doing things 0   Q2: Feeling down, depressed or hopeless 0   PHQ-2 Score 0   Q1: Little interest or pleasure in doing things Not at all   Q2: Feeling down, depressed or hopeless Not at all   PHQ-2 Score 0     He presents to clinic today for annual wellness visit.  Overall he is feeling well.  Blood pressure has been under good control.  He is wondering if at any point he will be able to come off of the blood pressure medications.  He continues to work on diet and exercise.    Have you ever done Advance Care Planning? (For example, a Health Directive, POLST, or a discussion with a medical provider or your loved ones about your wishes): No, advance care planning information given to patient to review.  Patient declined advance care planning discussion at this time.       Fall risk  Fallen 2 or more times in the past year?: No  Any fall with injury in the past year?: No    Cognitive Screening   1) " Repeat 3 items (Leader, Season, Table)    2) Clock draw: NORMAL  3) 3 item recall: Recalls 3 objects  Results: 3 items recalled: COGNITIVE IMPAIRMENT LESS LIKELY    Mini-CogTM Copyright VENU [employer]. Licensed by the author for use in [facility]; reprinted with permission (penny@[facility]). All rights reserved.      Do you have sleep apnea, excessive snoring or daytime drowsiness? : no    Reviewed and updated as needed this visit by clinical staff   Tobacco  Allergies  Meds      Soc Hx        Reviewed and updated as needed this visit by Provider                 Social History     Tobacco Use    Smoking status: Never    Smokeless tobacco: Former   Substance Use Topics    Alcohol use: Not Currently             11/28/2023     1:28 PM   Alcohol Use   Prescreen: >3 drinks/day or >7 drinks/week? Not Applicable     Do you have a current opioid prescription? No  Do you use any other controlled substances or medications that are not prescribed by a provider? None        Current providers sharing in care for this patient include:   Patient Care Team:  Izzy Mcleod APRN CNP as PCP - General (Nurse Practitioner - Family)  Izzy Mcleod APRN CNP as Assigned PCP    The following health maintenance items are reviewed in Epic and correct as of today:  Health Maintenance   Topic Date Due    ADVANCE CARE PLANNING  Never done    ZOSTER IMMUNIZATION (1 of 2) Never done    RSV VACCINE (Pregnancy & 60+) (1 - 1-dose 60+ series) Never done    Pneumococcal Vaccine: 65+ Years (1 - PCV) Never done    COLORECTAL CANCER SCREENING  12/17/2022    DTAP/TDAP/TD IMMUNIZATION (3 - Td or Tdap) 01/22/2023    INFLUENZA VACCINE (1) 09/01/2023    COVID-19 Vaccine (5 - 2023-24 season) 09/01/2023    MEDICARE ANNUAL WELLNESS VISIT  11/28/2024    FALL RISK ASSESSMENT  11/28/2024    LIPID  10/05/2027    HEPATITIS C SCREENING  Completed    PHQ-2 (once per calendar year)  Completed    AORTIC ANEURYSM SCREENING (SYSTEM ASSIGNED)  Completed     IPV IMMUNIZATION  Aged Out    HPV IMMUNIZATION  Aged Out    MENINGITIS IMMUNIZATION  Aged Out    RSV MONOCLONAL ANTIBODY  Aged Out     BP Readings from Last 3 Encounters:   11/28/23 138/86   06/22/23 122/80   10/05/22 132/80    Wt Readings from Last 3 Encounters:   11/28/23 86.4 kg (190 lb 6.4 oz)   06/22/23 87 kg (191 lb 12.8 oz)   10/05/22 87.4 kg (192 lb 9.6 oz)                  Patient Active Problem List   Diagnosis    CARDIOVASCULAR SCREENING; LDL GOAL LESS THAN 160    2019 novel coronavirus disease (COVID-19)    Essential hypertension     Past Surgical History:   Procedure Laterality Date    cyst removed      left wrist    HCL SQUAMOUS CELL CARCINOMA AG      left shoulder-excision       Social History     Tobacco Use    Smoking status: Never    Smokeless tobacco: Former   Substance Use Topics    Alcohol use: Not Currently     Family History   Problem Relation Age of Onset    Melanoma Mother     Skin Cancer Father     Prostate Cancer Father     Melanoma Sister     Hernia Brother          Current Outpatient Medications   Medication Sig Dispense Refill    amLODIPine (NORVASC) 10 MG tablet Take 1 tablet (10 mg) by mouth daily 90 tablet 3    lisinopril-hydrochlorothiazide (ZESTORETIC) 20-25 MG tablet Take 1 tablet by mouth daily 90 tablet 3     Allergies   Allergen Reactions    No Known Drug Allergy          Review of Systems   Constitutional:  Negative for chills and fever.   HENT:  Negative for congestion, ear pain, hearing loss and sore throat.    Eyes:  Negative for pain and visual disturbance.   Respiratory:  Negative for cough and shortness of breath.    Cardiovascular:  Negative for chest pain, palpitations and peripheral edema.   Gastrointestinal:  Negative for abdominal pain, constipation, diarrhea, heartburn, hematochezia and nausea.   Genitourinary:  Negative for dysuria, frequency, genital sores, hematuria, impotence, penile discharge and urgency.   Musculoskeletal:  Negative for arthralgias, joint  "swelling and myalgias.   Skin:  Negative for rash.   Neurological:  Negative for dizziness, weakness, headaches and paresthesias.   Psychiatric/Behavioral:  Negative for mood changes. The patient is not nervous/anxious.          OBJECTIVE:   /86   Pulse 84   Resp 18   Ht 1.664 m (5' 5.5\")   Wt 86.4 kg (190 lb 6.4 oz)   SpO2 97%   BMI 31.20 kg/m   Estimated body mass index is 31.2 kg/m  as calculated from the following:    Height as of this encounter: 1.664 m (5' 5.5\").    Weight as of this encounter: 86.4 kg (190 lb 6.4 oz).  Physical Exam  GENERAL: healthy, alert and no distress  EYES: Eyes grossly normal to inspection, PERRL and conjunctivae and sclerae normal  HENT: ear canals and TM's normal, nose and mouth without ulcers or lesions  NECK: no adenopathy, no asymmetry, masses, or scars and thyroid normal to palpation  RESP: lungs clear to auscultation - no rales, rhonchi or wheezes  CV: regular rate and rhythm, normal S1 S2, no S3 or S4, no murmur, click or rub, no peripheral edema and peripheral pulses strong  ABDOMEN: soft, nontender, no hepatosplenomegaly, no masses and bowel sounds normal  MS: no gross musculoskeletal defects noted, no edema  SKIN: no suspicious lesions or rashes  NEURO: Normal strength and tone, mentation intact and speech normal  PSYCH: mentation appears normal, affect normal/bright    Diagnostic Test Results:  Labs reviewed in Epic  Results for orders placed or performed in visit on 11/28/23 (from the past 24 hour(s))   Comprehensive Metabolic Panel   Result Value Ref Range    Sodium 140 135 - 145 mmol/L    Potassium 4.1 3.4 - 5.3 mmol/L    Carbon Dioxide (CO2) 26 22 - 29 mmol/L    Anion Gap 11 7 - 15 mmol/L    Urea Nitrogen 16.4 8.0 - 23.0 mg/dL    Creatinine 0.95 0.67 - 1.17 mg/dL    GFR Estimate 88 >60 mL/min/1.73m2    Calcium 9.3 8.8 - 10.2 mg/dL    Chloride 103 98 - 107 mmol/L    Glucose 99 70 - 99 mg/dL    Alkaline Phosphatase 71 40 - 150 U/L    AST 22 0 - 45 U/L    ALT " 14 0 - 70 U/L    Protein Total 7.5 6.4 - 8.3 g/dL    Albumin 4.7 3.5 - 5.2 g/dL    Bilirubin Total 0.5 <=1.2 mg/dL   Lipid Panel   Result Value Ref Range    Cholesterol 192 <200 mg/dL    Triglycerides 59 <150 mg/dL    Direct Measure HDL 50 >=40 mg/dL    LDL Cholesterol Calculated 130 (H) <=100 mg/dL    Non HDL Cholesterol 142 (H) <130 mg/dL    Narrative    Cholesterol  Desirable:  <200 mg/dL    Triglycerides  Normal:  Less than 150 mg/dL  Borderline High:  150-199 mg/dL  High:  200-499 mg/dL  Very High:  Greater than or equal to 500 mg/dL    Direct Measure HDL  Female:  Greater than or equal to 50 mg/dL   Male:  Greater than or equal to 40 mg/dL    LDL Cholesterol  Desirable:  <100mg/dL  Above Desirable:  100-129 mg/dL   Borderline High:  130-159 mg/dL   High:  160-189 mg/dL   Very High:  >= 190 mg/dL    Non HDL Cholesterol  Desirable:  130 mg/dL  Above Desirable:  130-159 mg/dL  Borderline High:  160-189 mg/dL  High:  190-219 mg/dL  Very High:  Greater than or equal to 220 mg/dL       ASSESSMENT / PLAN:       ICD-10-CM    1. Encounter for Medicare annual wellness exam  Z00.00       2. Essential hypertension  I10 amLODIPine (NORVASC) 10 MG tablet     lisinopril-hydrochlorothiazide (ZESTORETIC) 20-25 MG tablet     Comprehensive Metabolic Panel     Comprehensive Metabolic Panel      3. Lipid screening  Z13.220 Lipid Panel     Lipid Panel      4. Colon cancer screening  [facility]      Influenza vaccine updated today.  Recommend shingles, RSV, pneumonia, tetanus, COVID.  He will consider these and follow-up with pharmacy as desired.  Blood pressure is under good control, refilled amlodipine and lisinopril with hydrochlorothiazide x 1 year.  CMP updated.  We did discuss that blood pressure is at the high end of normal, at this time I do not anticipate him coming off of blood pressure medications.  Will continue to review this annually and as needed.  Lipid screening completed.  ASCVD risk score  "18.8%.  Cholesterol-lowering medication is recommended.  We discussed this briefly in office, letter was sent to discuss this in further detail.  If he would like to move forward with this, I will get this prescribed.  Colon cancer screening, Mik ordered.  He did not complete this when previously ordered.  Follow-up annually and as needed.    COUNSELING:  Reviewed preventive health counseling, as reflected in patient instructions       Regular exercise       Healthy diet/nutrition       Vision screening       Dental care       Colon cancer screening      BMI:   Estimated body mass index is 31.2 kg/m  as calculated from the following:    Height as of this encounter: 1.664 m (5' 5.5\").    Weight as of this encounter: 86.4 kg (190 lb 6.4 oz).   Weight management plan: Discussed healthy diet and exercise guidelines      He reports that he has never smoked. He has quit using smokeless tobacco.      Appropriate preventive services were discussed with this patient, including applicable screening as appropriate for fall prevention, nutrition, physical activity, Tobacco-use cessation, weight loss and cognition.  Checklist reviewing preventive services available has been given to the patient.    Reviewed patients plan of care and provided an AVS. The Basic Care Plan (routine screening as documented in Health Maintenance) for Gallo meets the Care Plan requirement. This Care Plan has been established and reviewed with the Patient.          BARBARA Fernandez [facility]    Identified Health Risks:  I have reviewed Opioid Use Disorder and Substance Use Disorder risk factors and made any needed referrals.   "

## 2023-11-28 NOTE — LETTER
November 28, 2023      Gallo Holliday  [address] [address]        Dear ,    We are writing to inform you of your test results.    Your test results fall within the expected range(s) or remain unchanged from previous results.  Please continue with current treatment plan.    Resulted Orders   Comprehensive Metabolic Panel   Result Value Ref Range    Sodium 140 135 - 145 mmol/L      Comment:      Reference intervals for this test were updated on 09/26/2023 to more accurately reflect our healthy population. There may be differences in the flagging of prior results with similar values performed with this method. Interpretation of those prior results can be made in the context of the updated reference intervals.     Potassium 4.1 3.4 - 5.3 mmol/L    Carbon Dioxide (CO2) 26 22 - 29 mmol/L    Anion Gap 11 7 - 15 mmol/L    Urea Nitrogen 16.4 8.0 - 23.0 mg/dL    Creatinine 0.95 0.67 - 1.17 mg/dL    GFR Estimate 88 >60 mL/min/1.73m2    Calcium 9.3 8.8 - 10.2 mg/dL    Chloride 103 98 - 107 mmol/L    Glucose 99 70 - 99 mg/dL    Alkaline Phosphatase 71 40 - 150 U/L      Comment:      Reference intervals for this test were updated on 11/14/2023 to more accurately reflect our healthy population. There may be differences in the flagging of prior results with similar values performed with this method. Interpretation of those prior results can be made in the context of the updated reference intervals.    AST 22 0 - 45 U/L      Comment:      Reference intervals for this test were updated on 6/12/2023 to more accurately reflect our healthy population. There may be differences in the flagging of prior results with similar values performed with this method. Interpretation of those prior results can be made in the context of the updated reference intervals.    ALT 14 0 - 70 U/L      Comment:      Reference intervals for this test were updated on 6/12/2023 to more accurately reflect our healthy population.  There may be differences in the flagging of prior results with similar values performed with this method. Interpretation of those prior results can be made in the context of the updated reference intervals.      Protein Total 7.5 6.4 - 8.3 g/dL    Albumin 4.7 3.5 - 5.2 g/dL    Bilirubin Total 0.5 <=1.2 mg/dL   Lipid Panel   Result Value Ref Range    Cholesterol 192 <200 mg/dL    Triglycerides 59 <150 mg/dL    Direct Measure HDL 50 >=40 mg/dL    LDL Cholesterol Calculated 130 (H) <=100 mg/dL    Non HDL Cholesterol 142 (H) <130 mg/dL    Narrative    Cholesterol  Desirable:  <200 mg/dL    Triglycerides  Normal:  Less than 150 mg/dL  Borderline High:  150-199 mg/dL  High:  200-499 mg/dL  Very High:  Greater than or equal to 500 mg/dL    Direct Measure HDL  Female:  Greater than or equal to 50 mg/dL   Male:  Greater than or equal to 40 mg/dL    LDL Cholesterol  Desirable:  <100mg/dL  Above Desirable:  100-129 mg/dL   Borderline High:  130-159 mg/dL   High:  160-189 mg/dL   Very High:  >= 190 mg/dL    Non HDL Cholesterol  Desirable:  130 mg/dL  Above Desirable:  130-159 mg/dL  Borderline High:  160-189 mg/dL  High:  190-219 mg/dL  Very High:  Greater than or equal to 220 mg/dL       If you have any questions or concerns, please call the clinic at the number listed above.       Sincerely,      BARBARA Ramos CNP

## 2024-12-17 DIAGNOSIS — I10 ESSENTIAL HYPERTENSION: ICD-10-CM

## 2024-12-17 RX ORDER — LISINOPRIL AND HYDROCHLOROTHIAZIDE 20; 25 MG/1; MG/1
1 TABLET ORAL DAILY
Qty: 30 TABLET | Refills: 0 | Status: SHIPPED | OUTPATIENT
Start: 2024-12-17

## 2024-12-17 RX ORDER — AMLODIPINE BESYLATE 10 MG/1
10 TABLET ORAL DAILY
Qty: 30 TABLET | Refills: 0 | Status: SHIPPED | OUTPATIENT
Start: 2024-12-17

## 2024-12-17 NOTE — TELEPHONE ENCOUNTER
Reason for call: Medication or medication refill    Have you contacted your pharmacy regarding this refill? yes    Name of medication requested: amlodipne and lisinopril    How many days of medication do you have left? About 3 days    What pharmacy do you use? [facility]    Preferred method for responding to this message: Telephone Call    Phone number patient can be reached at: Cell number on file:    Telephone Information:   Mobile [phone]       If we cannot reach you directly, may we leave a detailed response at the number you provided? Yes    Patient would like a phone call if this is approved. Patient does have an upcoming appt with [facility] on 1/9.    Latia Shah on 12/17/2024 at 4:18 PM

## 2024-12-17 NOTE — TELEPHONE ENCOUNTER
[facility] sent Rx request for the following:      Requested Prescriptions   Pending Prescriptions Disp Refills    amLODIPine (NORVASC) 10 MG tablet [Pharmacy Med Name: amlodipine 10 mg tablet] 90 tablet 3     Sig: Take 1 tablet (10 mg) by mouth daily   Last Prescription Date:   11/28/23  Last Fill Qty/Refills:         90, R-3      Calcium Channel Blockers Protocol  Failed - 12/17/2024  4:20 PM        Failed - Most recent blood pressure under 140/90 in past 12 months     BP Readings from Last 3 Encounters:   11/28/23 138/86   06/22/23 122/80   10/05/22 132/80   No data recorded        Failed - GFR is on file in the past 12 months and most recent GFR is normal       lisinopril-hydrochlorothiazide (ZESTORETIC) 20-25 MG tablet [Pharmacy Med Name: lisinopril 20 mg-hydrochlorothiazide 25 mg tablet] 90 tablet 3     Sig: Take 1 tablet by mouth daily   Last Prescription Date:   11/28/23  Last Fill Qty/Refills:         90, R-3      Diuretics (Including Combos) Protocol Failed - 12/17/2024  4:20 PM        Failed - Most recent blood pressure under 140/90 in past 12 months     BP Readings from Last 3 Encounters:   11/28/23 138/86   06/22/23 122/80   10/05/22 132/80   No data recorded        Failed - Potassium level on file in past 12 months        Failed - Has GFR on file in past 12 months and most recent value is normal        Failed - Recent (12 mo) or future (90 days) visit within the authorizing provider's specialty    ACE Inhibitors (Including Combos) Protocol Failed - 12/17/2024  4:20 PM        Failed - Most recent blood pressure under 140/90 in past 12 months- Clinicial or Patient Reported     BP Readings from Last 3 Encounters:   11/28/23 138/86   06/22/23 122/80   10/05/22 132/80   No data recorded        Failed - Recent (12 mo) or future (90 days) visit within the authorizing provider's specialty        Failed - Most recent GFR on file in the past 12 months >30        Failed - Normal serum potassium on file  in past 12 months     Recent Labs   Lab Test 11/28/23  1403   POTASSIUM 4.1        Last Office Visit:              11/28/23 (Physical)   Future Office visit:           1/9/25    Per LOV note:    Return in about 53 weeks (around 12/3/2024) for Annual Wellness Visit.    Unable to complete prescription refill per RN Medication Refill Policy. Afia Roberts RN .............. 12/17/2024  4:22 PM    12/30

## 2025-01-09 ENCOUNTER — OFFICE VISIT (OUTPATIENT)
Dept: FAMILY MEDICINE | Facility: OTHER | Age: 70
End: 2025-01-09
Attending: NURSE PRACTITIONER
Payer: MEDICARE

## 2025-01-09 ENCOUNTER — ORDERS ONLY (AUTO-RELEASED) (OUTPATIENT)
Dept: FAMILY MEDICINE | Facility: OTHER | Age: 70
End: 2025-01-09

## 2025-01-09 VITALS
DIASTOLIC BLOOD PRESSURE: 84 MMHG | BODY MASS INDEX: 31.43 KG/M2 | RESPIRATION RATE: 16 BRPM | HEART RATE: 80 BPM | WEIGHT: 195.6 LBS | SYSTOLIC BLOOD PRESSURE: 132 MMHG | HEIGHT: 66 IN | OXYGEN SATURATION: 97 % | TEMPERATURE: 98.4 F

## 2025-01-09 DIAGNOSIS — I10 ESSENTIAL HYPERTENSION: ICD-10-CM

## 2025-01-09 DIAGNOSIS — Z00.00 ENCOUNTER FOR MEDICARE ANNUAL WELLNESS EXAM: Primary | ICD-10-CM

## 2025-01-09 DIAGNOSIS — Z23 NEED FOR INFLUENZA VACCINATION: ICD-10-CM

## 2025-01-09 DIAGNOSIS — Z12.11 COLON CANCER SCREENING: ICD-10-CM

## 2025-01-09 DIAGNOSIS — Z12.5 SCREENING FOR PROSTATE CANCER: ICD-10-CM

## 2025-01-09 DIAGNOSIS — Z23 NEED FOR HEPATITIS B VACCINATION: ICD-10-CM

## 2025-01-09 DIAGNOSIS — Z13.220 LIPID SCREENING: ICD-10-CM

## 2025-01-09 DIAGNOSIS — Z23 NEED FOR PROPHYLACTIC VACCINATION AGAINST HEPATITIS B VIRUS: ICD-10-CM

## 2025-01-09 PROBLEM — U07.1 2019 NOVEL CORONAVIRUS DISEASE (COVID-19): Status: RESOLVED | Noted: 2021-01-04 | Resolved: 2025-01-09

## 2025-01-09 LAB
ALBUMIN SERPL BCG-MCNC: 4.7 G/DL (ref 3.5–5.2)
ALP SERPL-CCNC: 71 U/L (ref 40–150)
ALT SERPL W P-5'-P-CCNC: 20 U/L (ref 0–70)
ANION GAP SERPL CALCULATED.3IONS-SCNC: 15 MMOL/L (ref 7–15)
AST SERPL W P-5'-P-CCNC: 24 U/L (ref 0–45)
BILIRUB SERPL-MCNC: 0.7 MG/DL
BUN SERPL-MCNC: 20.8 MG/DL (ref 8–23)
CALCIUM SERPL-MCNC: 9.6 MG/DL (ref 8.8–10.4)
CHLORIDE SERPL-SCNC: 100 MMOL/L (ref 98–107)
CHOLEST SERPL-MCNC: 259 MG/DL
CREAT SERPL-MCNC: 1.06 MG/DL (ref 0.67–1.17)
EGFRCR SERPLBLD CKD-EPI 2021: 76 ML/MIN/1.73M2
FASTING STATUS PATIENT QL REPORTED: YES
FASTING STATUS PATIENT QL REPORTED: YES
GLUCOSE SERPL-MCNC: 103 MG/DL (ref 70–99)
HCO3 SERPL-SCNC: 27 MMOL/L (ref 22–29)
HDLC SERPL-MCNC: 54 MG/DL
LDLC SERPL CALC-MCNC: 165 MG/DL
NONHDLC SERPL-MCNC: 205 MG/DL
POTASSIUM SERPL-SCNC: 3.6 MMOL/L (ref 3.4–5.3)
PROT SERPL-MCNC: 7.9 G/DL (ref 6.4–8.3)
PSA SERPL DL<=0.01 NG/ML-MCNC: 3.02 NG/ML (ref 0–4.5)
SODIUM SERPL-SCNC: 142 MMOL/L (ref 135–145)
TRIGL SERPL-MCNC: 201 MG/DL

## 2025-01-09 PROCEDURE — G0103 PSA SCREENING: HCPCS | Mod: ZL | Performed by: NURSE PRACTITIONER

## 2025-01-09 PROCEDURE — G0008 ADMIN INFLUENZA VIRUS VAC: HCPCS

## 2025-01-09 PROCEDURE — G0463 HOSPITAL OUTPT CLINIC VISIT: HCPCS | Mod: 25

## 2025-01-09 PROCEDURE — 80053 COMPREHEN METABOLIC PANEL: CPT | Mod: ZL | Performed by: NURSE PRACTITIONER

## 2025-01-09 PROCEDURE — 80061 LIPID PANEL: CPT | Mod: ZL | Performed by: NURSE PRACTITIONER

## 2025-01-09 PROCEDURE — 36415 COLL VENOUS BLD VENIPUNCTURE: CPT | Mod: ZL | Performed by: NURSE PRACTITIONER

## 2025-01-09 RX ORDER — AMLODIPINE BESYLATE 10 MG/1
10 TABLET ORAL DAILY
Qty: 90 TABLET | Refills: 4 | Status: SHIPPED | OUTPATIENT
Start: 2025-01-09

## 2025-01-09 RX ORDER — LISINOPRIL AND HYDROCHLOROTHIAZIDE 20; 25 MG/1; MG/1
1 TABLET ORAL DAILY
Qty: 90 TABLET | Refills: 4 | Status: SHIPPED | OUTPATIENT
Start: 2025-01-09

## 2025-01-09 SDOH — HEALTH STABILITY: PHYSICAL HEALTH: ON AVERAGE, HOW MANY DAYS PER WEEK DO YOU ENGAGE IN MODERATE TO STRENUOUS EXERCISE (LIKE A BRISK WALK)?: 3 DAYS

## 2025-01-09 ASSESSMENT — SOCIAL DETERMINANTS OF HEALTH (SDOH): HOW OFTEN DO YOU GET TOGETHER WITH FRIENDS OR RELATIVES?: THREE TIMES A WEEK

## 2025-01-09 ASSESSMENT — PAIN SCALES - GENERAL: PAINLEVEL_OUTOF10: NO PAIN (0)

## 2025-01-09 NOTE — NURSING NOTE
Patient presents today for annual medicare wellness visit.    Medication Reconciliation Complete    Sangeeta Mcnally LPN  1/9/2025 12:40 PM

## 2025-01-09 NOTE — PATIENT INSTRUCTIONS
Patient Education   Preventive Care Advice   This is general advice given by our system to help you stay healthy. However, your care team may have specific advice just for you. Please talk to your care team about your preventive care needs.  Nutrition  Eat 5 or more servings of fruits and vegetables each day.  Try wheat bread, brown rice and whole grain pasta (instead of white bread, rice, and pasta).  Get enough calcium and vitamin D. Check the label on foods and aim for 100% of the RDA (recommended daily allowance).  Lifestyle  Exercise at least 150 minutes each week  (30 minutes a day, 5 days a week).  Do muscle strengthening activities 2 days a week. These help control your weight and prevent disease.  No smoking.  Wear sunscreen to prevent skin cancer.  Have a dental exam and cleaning every 6 months.  Yearly exams  See your health care team every year to talk about:  Any changes in your health.  Any medicines your care team has prescribed.  Preventive care, family planning, and ways to prevent chronic diseases.  Shots (vaccines)   HPV shots (up to age 26), if you've never had them before.  Hepatitis B shots (up to age 59), if you've never had them before.  COVID-19 shot: Get this shot when it's due.  Flu shot: Get a flu shot every year.  Tetanus shot: Get a tetanus shot every 10 years.  Pneumococcal, hepatitis A, and RSV shots: Ask your care team if you need these based on your risk.  Shingles shot (for age 50 and up)  General health tests  Diabetes screening:  Starting at age 35, Get screened for diabetes at least every 3 years.  If you are younger than age 35, ask your care team if you should be screened for diabetes.  Cholesterol test: At age 39, start having a cholesterol test every 5 years, or more often if advised.  Bone density scan (DEXA): At age 50, ask your care team if you should have this scan for osteoporosis (brittle bones).  Hepatitis C: Get tested at least once in your life.  STIs (sexually  transmitted infections)  Before age 24: Ask your care team if you should be screened for STIs.  After age 24: Get screened for STIs if you're at risk. You are at risk for STIs (including HIV) if:  You are sexually active with more than one person.  You don't use condoms every time.  You or a partner was diagnosed with a sexually transmitted infection.  If you are at risk for HIV, ask about PrEP medicine to prevent HIV.  Get tested for HIV at least once in your life, whether you are at risk for HIV or not.  Cancer screening tests  Cervical cancer screening: If you have a cervix, begin getting regular cervical cancer screening tests starting at age 21.  Breast cancer scan (mammogram): If you've ever had breasts, begin having regular mammograms starting at age 40. This is a scan to check for breast cancer.  Colon cancer screening: It is important to start screening for colon cancer at age 45.  Have a colonoscopy test every 10 years (or more often if you're at risk) Or, ask your provider about stool tests like a FIT test every year or Cologuard test every 3 years.  To learn more about your testing options, visit:   .  For help making a decision, visit:   https://bit.ly/uq29505.  Prostate cancer screening test: If you have a prostate, ask your care team if a prostate cancer screening test (PSA) at age 55 is right for you.  Lung cancer screening: If you are a current or former smoker ages 50 to 80, ask your care team if ongoing lung cancer screenings are right for you.  For informational purposes only. Not to replace the advice of your health care provider. Copyright   2023 [employer]. All rights reserved. Clinically reviewed by the [facility] Transitions Program. [employer] [id number] - REV 01/24.

## 2025-01-09 NOTE — PROGRESS NOTES
"Preventive Care Visit  [facility]  BARBARA Fernandez CNP, Nurse Practitioner - Family  Jan 9, 2025  {Provider  Link to SmartSet :544796}    Assessment & Plan   Problem List Items Addressed This Visit          Circulatory    Essential hypertension     Other Visit Diagnoses       Encounter for Medicare annual wellness exam    -  Primary    Colon cancer screening        Screening for prostate cancer        Need for hepatitis B vaccination        Lipid screening        Need for prophylactic vaccination against hepatitis B virus        Need for influenza vaccination                 {Patient advised of split billing (Optional):634372}       BMI  Estimated body mass index is 32.05 kg/m  as calculated from the following:    Height as of this encounter: 1.664 m (5' 5.5\").    Weight as of this encounter: 88.7 kg (195 lb 9.6 oz).   Weight management plan: Discussed healthy diet and exercise guidelines    Counseling  Appropriate preventive services were addressed with this patient via screening, questionnaire, or discussion as appropriate for fall prevention, nutrition, physical activity, Tobacco-use cessation, social engagement, weight loss and cognition.  Checklist reviewing preventive services available has been given to the patient.  Reviewed patient's diet, addressing concerns and/or questions.   He is at risk for lack of exercise and has been provided with information to increase physical activity for the benefit of his well-being.   The patient was instructed to see the dentist every 6 months.         No follow-ups on file.      Ashley Holder is a 69 year old, presenting for the following:  Medicare Visit      {ROOMER if patient is in their first year of Medicare a vision screen is required click here to document the Vison screen and then refresh the note to pull in results  :[id number]}      History of Present Illness       Hypertension: He presents for follow up of hypertension.  He does " check blood pressure  regularly outside of the clinic. Outpatient blood pressures have not been over 140/90. He follows a low salt diet.      ***    Health Care Directive  Patient does not have a Health Care Directive:       1/9/2025   General Health   How would you rate your overall physical health? Excellent   Feel stress (tense, anxious, or unable to sleep) Not at all         1/9/2025   Nutrition   Diet: Low salt    Carbohydrate counting    Breakfast skipped       Multiple values from one day are sorted in reverse-chronological order         1/9/2025   Exercise   Days per week of moderate/strenous exercise 3 days         1/9/2025   Social Factors   Frequency of gathering with friends or relatives Three times a week   Worry food won't last until get money to buy more No   Food not last or not have enough money for food? No   Do you have housing? (Housing is defined as stable permanent housing and does not include staying ouside in a car, in a tent, in an abandoned building, in an overnight shelter, or couch-surfing.) Yes   Are you worried about losing your housing? No   Lack of transportation? No   Unable to get utilities (heat,electricity)? No         1/9/2025   Fall Risk   Fallen 2 or more times in the past year? No    No   Trouble with walking or balance? No    No       Multiple values from one day are sorted in reverse-chronological order          1/9/2025   Activities of Daily Living- Home Safety   Needs help with the following daily activites None of the above   Safety concerns in the home None of the above         1/9/2025   Dental   Dentist two times every year? (!) NO         1/9/2025   Hearing Screening   Hearing concerns? None of the above         1/9/2025   Driving Risk Screening   Patient/family members have concerns about driving No         1/9/2025   General Alertness/Fatigue Screening   Have you been more tired than usual lately? No         1/9/2025   Urinary Incontinence Screening   Bothered by  leaking urine in past 6 months No         1/9/2025   TB Screening   Were you born outside of the [place]? No         Today's PHQ-2 Score:       1/9/2025    12:36 PM   PHQ-2 ( 1999 Pfizer)   Q1: Little interest or pleasure in doing things 0   Q2: Feeling down, depressed or hopeless 0   PHQ-2 Score 0    Q1: Little interest or pleasure in doing things Not at all   Q2: Feeling down, depressed or hopeless Not at all   PHQ-2 Score 0       Patient-reported           1/9/2025   Substance Use   Alcohol more than 3/day or more than 7/wk Not Applicable   Do you have a current opioid prescription? No   How severe/bad is pain from 1 to 10? 1/10   Do you use any other substances recreationally? No     Social History     Tobacco Use     Smoking status: Never     Smokeless tobacco: Former   Vaping Use     Vaping status: Never Used   Substance Use Topics     Alcohol use: Not Currently     Drug use: Never     {Provider  If there are gaps in the social history shown above, please follow the link to update and then refresh the note Link to Social and Substance History :[id number]}      1/9/2025   AAA Screening   Family history of Abdominal Aortic Aneurysm (AAA)? No   Last PSA:   PSA   Date Value Ref Range Status   01/15/2007 1.43 0 - 4 ug/L Final     Prostate Specific Antigen Screen   Date Value Ref Range Status   10/05/2022 1.89 0.00 - 4.00 ug/L Final     ASCVD Risk   The 10-year ASCVD risk score (Celso LYON, et al., 2019) is: 18.8%    Values used to calculate the score:      Age: 69 years      Sex: Male      Is Non- : No      Diabetic: No      Tobacco smoker: No      Systolic Blood Pressure: 132 mmHg      Is BP treated: Yes      HDL Cholesterol: 50 mg/dL      Total Cholesterol: 192 mg/dL        {Provider  REQUIRED FOR AWV Use the storyboard to review patient history, after sections have been marked as reviewed, refresh note to capture documentation:[id number]}  {Provider   REQUIRED AWV use this link to review  and update sexual activity history  after section has been marked as reviewed, refresh note to capture documentation:[id number]}  Reviewed and updated as needed this visit by Provider   Tobacco  Allergies  Meds  Problems  Med Hx  Surg Hx  Fam Hx            Past Medical History:   Diagnosis Date     2019 novel coronavirus disease (COVID-19) 1/4/2021 2019 novel coronavirus disease (COVID-19) 1/4/2021     Squamous cell carcinoma of skin 11/27/2015     Past Surgical History:   Procedure Laterality Date     cyst removed      left wrist     HCL SQUAMOUS CELL CARCINOMA AG      left shoulder-excision     Patient Active Problem List   Diagnosis     CARDIOVASCULAR SCREENING; LDL GOAL LESS THAN 160     Essential hypertension     Past Surgical History:   Procedure Laterality Date     cyst removed      left wrist     HCL SQUAMOUS CELL CARCINOMA AG      left shoulder-excision       Social History     Tobacco Use     Smoking status: Never     Smokeless tobacco: Former   Substance Use Topics     Alcohol use: Not Currently     Family History   Problem Relation Age of Onset     Melanoma Mother      Skin Cancer Father      Prostate Cancer Father      Melanoma Sister      Hernia Brother          Current Outpatient Medications   Medication Sig Dispense Refill     amLODIPine (NORVASC) 10 MG tablet Take 1 tablet (10 mg) by mouth daily 30 tablet 0     lisinopril-hydrochlorothiazide (ZESTORETIC) 20-25 MG tablet Take 1 tablet by mouth daily 30 tablet 0     Allergies   Allergen Reactions     No Known Drug Allergy      Current providers sharing in care for this patient include:  Patient Care Team:  Izzy Mcleod APRN CNP as PCP - General (Nurse Practitioner - Family)  Izzy Mcleod APRN CNP as Assigned PCP    The following health maintenance items are reviewed in Epic and correct as of today:  Health Maintenance   Topic Date Due     Pneumococcal Vaccine: 50+ Years (1 of 1 - PCV) Never done     ZOSTER IMMUNIZATION (1 of 2)  "Never done     COLORECTAL CANCER SCREENING  12/17/2022     INFLUENZA VACCINE (1) 09/01/2024     COVID-19 Vaccine (5 - 2024-25 season) 09/01/2024     BMP  11/28/2024     MEDICARE ANNUAL WELLNESS VISIT  01/09/2026     FALL RISK ASSESSMENT  01/09/2026     GLUCOSE  11/28/2026     LIPID  11/28/2028     ADVANCE CARE PLANNING  11/29/2028     RSV VACCINE (1 - 1-dose 75+ series) 06/24/2031     DTAP/TDAP/TD IMMUNIZATION (4 - Td or Tdap) 11/28/2033     HEPATITIS C SCREENING  Completed     PHQ-2 (once per calendar year)  Completed     HPV IMMUNIZATION  Aged Out     MENINGITIS IMMUNIZATION  Aged Out     RSV MONOCLONAL ANTIBODY  Aged Out          Objective    Exam  /84   Pulse 80   Temp 98.4  F (36.9  C)   Resp 16   Ht 1.664 m (5' 5.5\")   Wt 88.7 kg (195 lb 9.6 oz)   SpO2 97%   BMI 32.05 kg/m     Estimated body mass index is 32.05 kg/m  as calculated from the following:    Height as of this encounter: 1.664 m (5' 5.5\").    Weight as of this encounter: 88.7 kg (195 lb 9.6 oz).    Physical Exam  GENERAL: alert and no distress  EYES: Eyes grossly normal to inspection, PERRL and conjunctivae and sclerae normal  HENT: ear canals and TM's normal, nose and mouth without ulcers or lesions  NECK: no adenopathy, no asymmetry, masses, or scars  RESP: lungs clear to auscultation - no rales, rhonchi or wheezes  CV: regular rate and rhythm, normal S1 S2, no S3 or S4, no murmur, click or rub, no peripheral edema  ABDOMEN: soft, nontender, no hepatosplenomegaly, no masses and bowel sounds normal  MS: no gross musculoskeletal defects noted, no edema  SKIN: no suspicious lesions or rashes  NEURO: Normal strength and tone, mentation intact and speech normal  PSYCH: mentation appears normal, affect normal/bright        1/9/2025   Mini Cog   Clock Draw Score 2 Normal   3 Item Recall 3 objects recalled   Mini Cog Total Score 5     {A Mini-Cog total score of 0-2 suggests the possibility of dementia, score of 3-5 suggests no " dementia:[id number]}         Signed Electronically by: BARBARA Fernandez CNP  {Email feedback regarding this note to [email or web address]   :[id number]}

## 2025-01-09 NOTE — LETTER
January 13, 2025      Gallo Holliday  [address] [facility] [address]        Dear ,    We are writing to inform you of your test results.    Test results indicate you may require additional follow up, see comment below.    Overall, your labs are stable.  Your cholesterol levels have climbed.  I would recommend working on dietary changes, increasing exercise and working on weight loss.  You may want to consider Cholesterol-lowering medications as your 10 year risk of a heart attack/stroke is 21.3%. We recommend medications when you are greater than 7.5%. If you want to discuss this further, let me know.     The 10-year ASCVD risk score (Celso LYON, et al., 2019) is: 21.3%    Values used to calculate the score:      Age: 69 years      Sex: Male      Is Non- : No      Diabetic: No      Tobacco smoker: No      Systolic Blood Pressure: 132 mmHg      Is BP treated: Yes      HDL Cholesterol: 54 mg/dL      Total Cholesterol: 259 mg/dL      Resulted Orders   Comprehensive Metabolic Panel   Result Value Ref Range    Sodium 142 135 - 145 mmol/L    Potassium 3.6 3.4 - 5.3 mmol/L    Carbon Dioxide (CO2) 27 22 - 29 mmol/L    Anion Gap 15 7 - 15 mmol/L    Urea Nitrogen 20.8 8.0 - 23.0 mg/dL    Creatinine 1.06 0.67 - 1.17 mg/dL    GFR Estimate 76 >60 mL/min/1.73m2      Comment:      eGFR calculated using 2021 CKD-EPI equation.    Calcium 9.6 8.8 - 10.4 mg/dL      Comment:      Reference intervals for this test were updated on 7/16/2024 to reflect our healthy population more accurately. There may be differences in the flagging of prior results with similar values performed with this method. Those prior results can be interpreted in the context of the updated reference intervals.    Chloride 100 98 - 107 mmol/L    Glucose 103 (H) 70 - 99 mg/dL    Alkaline Phosphatase 71 40 - 150 U/L    AST 24 0 - 45 U/L    ALT 20 0 - 70 U/L    Protein Total 7.9 6.4 - 8.3 g/dL    Albumin 4.7 3.5 - 5.2 g/dL     Bilirubin Total 0.7 <=1.2 mg/dL    Patient Fasting > 8hrs? Yes    Lipid Panel   Result Value Ref Range    Cholesterol 259 (H) <200 mg/dL    Triglycerides 201 (H) <150 mg/dL    Direct Measure HDL 54 >=40 mg/dL    LDL Cholesterol Calculated 165 (H) <100 mg/dL    Non HDL Cholesterol 205 (H) <130 mg/dL    Patient Fasting > 8hrs? Yes     Narrative    Cholesterol  Desirable: < 200 mg/dL  Borderline High: 200 - 239 mg/dL  High: >= 240 mg/dL    Triglycerides  Normal: < 150 mg/dL  Borderline High: 150 - 199 mg/dL  High: 200-499 mg/dL  Very High: >= 500 mg/dL    Direct Measure HDL  Female: >= 50 mg/dL   Male: >= 40 mg/dL    LDL Cholesterol  Desirable: < 100 mg/dL  Above Desirable: 100 - 129 mg/dL   Borderline High: 130 - 159 mg/dL   High:  160 - 189 mg/dL   Very High: >= 190 mg/dL    Non HDL Cholesterol  Desirable: < 130 mg/dL  Above Desirable: 130 - 159 mg/dL  Borderline High: 160 - 189 mg/dL  High: 190 - 219 mg/dL  Very High: >= 220 mg/dL   PSA Screen GH   Result Value Ref Range    Prostate Specific Antigen Screen 3.02 0.00 - 4.50 ng/mL    Narrative    This result is obtained using the Roche Elecsys total PSA method on the miguelina e601 immunoassay analyzer, which is an ultrasensitive method. Results obtained with different assay methods or kits cannot be used interchangeably.  This test is intended for initial prostate cancer screening. PSA values exceeding the age-specific limits are suspicious for prostate disease, but additional testing, such as prostate biopsy, is needed to diagnose prostate pathology. The American Cancer Society recommends annual examination with digital rectal examination and serum PSA beginning at age 50 and for men with a life expectancy of at least 10 years after detection of prostate cancer. For men in high-risk groups, such as  Americans or men with a first-degree relative diagnosed at a younger age, testing should begin at a younger age. It is generally recommended that information be  provided to patients about the benefits and limitations of testing and treatment so they can make informed decisions.       If you have any questions or concerns, please call the clinic at the number listed above.       Sincerely,      BARBARA Ramos CNP    Electronically signed